# Patient Record
Sex: FEMALE | Race: ASIAN | NOT HISPANIC OR LATINO | Employment: UNEMPLOYED | URBAN - METROPOLITAN AREA
[De-identification: names, ages, dates, MRNs, and addresses within clinical notes are randomized per-mention and may not be internally consistent; named-entity substitution may affect disease eponyms.]

---

## 2024-11-21 ENCOUNTER — HOSPITAL ENCOUNTER (OUTPATIENT)
Facility: HOSPITAL | Age: 59
Discharge: HOME HEALTH | End: 2024-11-22
Attending: ORTHOPAEDIC SURGERY | Admitting: ORTHOPAEDIC SURGERY
Payer: MEDICAID

## 2024-11-21 ENCOUNTER — ANESTHESIA (OUTPATIENT)
Dept: PERIOP | Facility: HOSPITAL | Age: 59
End: 2024-11-21
Payer: MEDICAID

## 2024-11-21 ENCOUNTER — ANESTHESIA EVENT (OUTPATIENT)
Dept: PERIOP | Facility: HOSPITAL | Age: 59
End: 2024-11-21
Payer: MEDICAID

## 2024-11-21 ENCOUNTER — APPOINTMENT (OUTPATIENT)
Dept: GENERAL RADIOLOGY | Facility: HOSPITAL | Age: 59
End: 2024-11-21
Payer: MEDICAID

## 2024-11-21 DIAGNOSIS — Z96.651 STATUS POST TOTAL KNEE REPLACEMENT, RIGHT: Primary | ICD-10-CM

## 2024-11-21 PROCEDURE — 25010000002 ROPIVACAINE PER 1 MG: Performed by: STUDENT IN AN ORGANIZED HEALTH CARE EDUCATION/TRAINING PROGRAM

## 2024-11-21 PROCEDURE — 25010000002 LIDOCAINE PF 2% 2 % SOLUTION

## 2024-11-21 PROCEDURE — 25010000002 LIDOCAINE PF 1% 1 % SOLUTION: Performed by: STUDENT IN AN ORGANIZED HEALTH CARE EDUCATION/TRAINING PROGRAM

## 2024-11-21 PROCEDURE — 97530 THERAPEUTIC ACTIVITIES: CPT

## 2024-11-21 PROCEDURE — 25010000002 MAGNESIUM SULFATE PER 500 MG OF MAGNESIUM

## 2024-11-21 PROCEDURE — C1776 JOINT DEVICE (IMPLANTABLE): HCPCS | Performed by: ORTHOPAEDIC SURGERY

## 2024-11-21 PROCEDURE — 25010000002 HYDROMORPHONE PER 4 MG

## 2024-11-21 PROCEDURE — 25010000002 FENTANYL CITRATE (PF) 50 MCG/ML SOLUTION: Performed by: STUDENT IN AN ORGANIZED HEALTH CARE EDUCATION/TRAINING PROGRAM

## 2024-11-21 PROCEDURE — 25010000002 ONDANSETRON PER 1 MG

## 2024-11-21 PROCEDURE — 25010000002 CLONIDINE PER 1 MG: Performed by: ORTHOPAEDIC SURGERY

## 2024-11-21 PROCEDURE — 25810000003 LACTATED RINGERS PER 1000 ML: Performed by: STUDENT IN AN ORGANIZED HEALTH CARE EDUCATION/TRAINING PROGRAM

## 2024-11-21 PROCEDURE — 25010000002 KETOROLAC TROMETHAMINE PER 15 MG: Performed by: ORTHOPAEDIC SURGERY

## 2024-11-21 PROCEDURE — 25010000002 DEXAMETHASONE SODIUM PHOSPHATE 20 MG/5ML SOLUTION

## 2024-11-21 PROCEDURE — 25010000002 MIDAZOLAM PER 1 MG: Performed by: STUDENT IN AN ORGANIZED HEALTH CARE EDUCATION/TRAINING PROGRAM

## 2024-11-21 PROCEDURE — 25010000002 DEXAMETHASONE PER 1 MG: Performed by: STUDENT IN AN ORGANIZED HEALTH CARE EDUCATION/TRAINING PROGRAM

## 2024-11-21 PROCEDURE — 25010000002 PROPOFOL 200 MG/20ML EMULSION

## 2024-11-21 PROCEDURE — 25010000002 SUGAMMADEX 200 MG/2ML SOLUTION

## 2024-11-21 PROCEDURE — G0378 HOSPITAL OBSERVATION PER HR: HCPCS

## 2024-11-21 PROCEDURE — 25010000002 EPINEPHRINE 1 MG/ML SOLUTION 30 ML VIAL: Performed by: ORTHOPAEDIC SURGERY

## 2024-11-21 PROCEDURE — 25010000002 HYDROMORPHONE PER 4 MG: Performed by: ORTHOPAEDIC SURGERY

## 2024-11-21 PROCEDURE — 25010000002 ROPIVACAINE PER 1 MG: Performed by: ORTHOPAEDIC SURGERY

## 2024-11-21 PROCEDURE — 25010000002 FENTANYL CITRATE (PF) 50 MCG/ML SOLUTION

## 2024-11-21 PROCEDURE — 25010000002 CEFAZOLIN PER 500 MG: Performed by: ORTHOPAEDIC SURGERY

## 2024-11-21 PROCEDURE — 97162 PT EVAL MOD COMPLEX 30 MIN: CPT

## 2024-11-21 PROCEDURE — 25010000002 GLYCOPYRROLATE 0.2 MG/ML SOLUTION

## 2024-11-21 PROCEDURE — C1713 ANCHOR/SCREW BN/BN,TIS/BN: HCPCS | Performed by: ORTHOPAEDIC SURGERY

## 2024-11-21 PROCEDURE — 73560 X-RAY EXAM OF KNEE 1 OR 2: CPT

## 2024-11-21 PROCEDURE — 25010000002 FENTANYL CITRATE (PF) 100 MCG/2ML SOLUTION

## 2024-11-21 DEVICE — IMPLANTABLE DEVICE
Type: IMPLANTABLE DEVICE | Site: KNEE | Status: FUNCTIONAL
Brand: PERSONA® VIVACIT-E®

## 2024-11-21 DEVICE — STEM TIB/KN PERSONA CMT 5D SZC RT: Type: IMPLANTABLE DEVICE | Site: KNEE | Status: FUNCTIONAL

## 2024-11-21 DEVICE — DEV CONTRL TISS STRATAFIX SYMM PDS PLUS VIL CT-1 60CM: Type: IMPLANTABLE DEVICE | Site: KNEE | Status: FUNCTIONAL

## 2024-11-21 DEVICE — IMPLANTABLE DEVICE
Type: IMPLANTABLE DEVICE | Site: KNEE | Status: FUNCTIONAL
Brand: BIOMET® BONE CEMENT R

## 2024-11-21 DEVICE — PAT KN PERSONA VE CRS/LNK CMT 8X29MM: Type: IMPLANTABLE DEVICE | Site: KNEE | Status: FUNCTIONAL

## 2024-11-21 DEVICE — DEV CONTRL TISS STRATAFIXSPIRALMNCRYL PLSPS2 REV3/0 45CM: Type: IMPLANTABLE DEVICE | Site: KNEE | Status: FUNCTIONAL

## 2024-11-21 DEVICE — COMP FEM/KN PERSONA CR CMT COCR NRW SZ5 RT: Type: IMPLANTABLE DEVICE | Site: KNEE | Status: FUNCTIONAL

## 2024-11-21 DEVICE — CAP TOTL KN CMT PRIMARY: Type: IMPLANTABLE DEVICE | Status: FUNCTIONAL

## 2024-11-21 RX ORDER — PROMETHAZINE HYDROCHLORIDE 25 MG/1
25 SUPPOSITORY RECTAL ONCE AS NEEDED
Status: DISCONTINUED | OUTPATIENT
Start: 2024-11-21 | End: 2024-11-21 | Stop reason: HOSPADM

## 2024-11-21 RX ORDER — PROPOFOL 10 MG/ML
INJECTION, EMULSION INTRAVENOUS AS NEEDED
Status: DISCONTINUED | OUTPATIENT
Start: 2024-11-21 | End: 2024-11-21 | Stop reason: SURG

## 2024-11-21 RX ORDER — ROPIVACAINE HYDROCHLORIDE 5 MG/ML
INJECTION, SOLUTION EPIDURAL; INFILTRATION; PERINEURAL
Status: COMPLETED | OUTPATIENT
Start: 2024-11-21 | End: 2024-11-21

## 2024-11-21 RX ORDER — HYDROCODONE BITARTRATE AND ACETAMINOPHEN 5; 325 MG/1; MG/1
1 TABLET ORAL ONCE AS NEEDED
Status: DISCONTINUED | OUTPATIENT
Start: 2024-11-21 | End: 2024-11-21 | Stop reason: HOSPADM

## 2024-11-21 RX ORDER — PROMETHAZINE HYDROCHLORIDE 25 MG/1
25 TABLET ORAL ONCE AS NEEDED
Status: DISCONTINUED | OUTPATIENT
Start: 2024-11-21 | End: 2024-11-21 | Stop reason: HOSPADM

## 2024-11-21 RX ORDER — ONDANSETRON 2 MG/ML
INJECTION INTRAMUSCULAR; INTRAVENOUS AS NEEDED
Status: DISCONTINUED | OUTPATIENT
Start: 2024-11-21 | End: 2024-11-21 | Stop reason: SURG

## 2024-11-21 RX ORDER — HYDROMORPHONE HYDROCHLORIDE 1 MG/ML
0.5 INJECTION, SOLUTION INTRAMUSCULAR; INTRAVENOUS; SUBCUTANEOUS
Status: DISCONTINUED | OUTPATIENT
Start: 2024-11-21 | End: 2024-11-22 | Stop reason: HOSPADM

## 2024-11-21 RX ORDER — LIDOCAINE HYDROCHLORIDE 10 MG/ML
0.5 INJECTION, SOLUTION INFILTRATION; PERINEURAL ONCE AS NEEDED
Status: COMPLETED | OUTPATIENT
Start: 2024-11-21 | End: 2024-11-21

## 2024-11-21 RX ORDER — CETIRIZINE HYDROCHLORIDE 10 MG/1
10 TABLET ORAL DAILY
Status: DISCONTINUED | OUTPATIENT
Start: 2024-11-21 | End: 2024-11-22 | Stop reason: HOSPADM

## 2024-11-21 RX ORDER — DIPHENHYDRAMINE HYDROCHLORIDE 50 MG/ML
12.5 INJECTION INTRAMUSCULAR; INTRAVENOUS
Status: DISCONTINUED | OUTPATIENT
Start: 2024-11-21 | End: 2024-11-21 | Stop reason: HOSPADM

## 2024-11-21 RX ORDER — LABETALOL HYDROCHLORIDE 5 MG/ML
5 INJECTION, SOLUTION INTRAVENOUS
Status: DISCONTINUED | OUTPATIENT
Start: 2024-11-21 | End: 2024-11-21 | Stop reason: HOSPADM

## 2024-11-21 RX ORDER — MAGNESIUM SULFATE HEPTAHYDRATE 500 MG/ML
INJECTION, SOLUTION INTRAMUSCULAR; INTRAVENOUS AS NEEDED
Status: DISCONTINUED | OUTPATIENT
Start: 2024-11-21 | End: 2024-11-21 | Stop reason: SURG

## 2024-11-21 RX ORDER — HYDRALAZINE HYDROCHLORIDE 50 MG/1
50 TABLET, FILM COATED ORAL 3 TIMES DAILY
Status: DISCONTINUED | OUTPATIENT
Start: 2024-11-21 | End: 2024-11-22 | Stop reason: HOSPADM

## 2024-11-21 RX ORDER — DULOXETIN HYDROCHLORIDE 20 MG/1
20 CAPSULE, DELAYED RELEASE ORAL DAILY
Status: DISCONTINUED | OUTPATIENT
Start: 2024-11-21 | End: 2024-11-22 | Stop reason: HOSPADM

## 2024-11-21 RX ORDER — KETOROLAC TROMETHAMINE 30 MG/ML
30 INJECTION, SOLUTION INTRAMUSCULAR; INTRAVENOUS EVERY 6 HOURS PRN
Status: DISCONTINUED | OUTPATIENT
Start: 2024-11-21 | End: 2024-11-22 | Stop reason: HOSPADM

## 2024-11-21 RX ORDER — OXYCODONE AND ACETAMINOPHEN 7.5; 325 MG/1; MG/1
1 TABLET ORAL EVERY 4 HOURS PRN
Status: DISCONTINUED | OUTPATIENT
Start: 2024-11-21 | End: 2024-11-21 | Stop reason: HOSPADM

## 2024-11-21 RX ORDER — ONDANSETRON 4 MG/1
4 TABLET, ORALLY DISINTEGRATING ORAL EVERY 6 HOURS PRN
Status: DISCONTINUED | OUTPATIENT
Start: 2024-11-21 | End: 2024-11-22 | Stop reason: HOSPADM

## 2024-11-21 RX ORDER — FERROUS SULFATE 325(65) MG
325 TABLET ORAL
Status: DISCONTINUED | OUTPATIENT
Start: 2024-11-22 | End: 2024-11-22 | Stop reason: HOSPADM

## 2024-11-21 RX ORDER — SODIUM CHLORIDE 0.9 % (FLUSH) 0.9 %
3-10 SYRINGE (ML) INJECTION AS NEEDED
Status: DISCONTINUED | OUTPATIENT
Start: 2024-11-21 | End: 2024-11-21 | Stop reason: HOSPADM

## 2024-11-21 RX ORDER — HYDROMORPHONE HYDROCHLORIDE 1 MG/ML
0.5 INJECTION, SOLUTION INTRAMUSCULAR; INTRAVENOUS; SUBCUTANEOUS
Status: DISCONTINUED | OUTPATIENT
Start: 2024-11-21 | End: 2024-11-21 | Stop reason: HOSPADM

## 2024-11-21 RX ORDER — NALOXONE HCL 0.4 MG/ML
0.2 VIAL (ML) INJECTION AS NEEDED
Status: DISCONTINUED | OUTPATIENT
Start: 2024-11-21 | End: 2024-11-21 | Stop reason: HOSPADM

## 2024-11-21 RX ORDER — ONDANSETRON 2 MG/ML
4 INJECTION INTRAMUSCULAR; INTRAVENOUS EVERY 6 HOURS PRN
Status: DISCONTINUED | OUTPATIENT
Start: 2024-11-21 | End: 2024-11-22 | Stop reason: HOSPADM

## 2024-11-21 RX ORDER — HYDROCODONE BITARTRATE AND ACETAMINOPHEN 7.5; 325 MG/1; MG/1
2 TABLET ORAL EVERY 4 HOURS PRN
Status: DISCONTINUED | OUTPATIENT
Start: 2024-11-21 | End: 2024-11-22 | Stop reason: HOSPADM

## 2024-11-21 RX ORDER — FLUMAZENIL 0.1 MG/ML
0.2 INJECTION INTRAVENOUS AS NEEDED
Status: DISCONTINUED | OUTPATIENT
Start: 2024-11-21 | End: 2024-11-21 | Stop reason: HOSPADM

## 2024-11-21 RX ORDER — DROPERIDOL 2.5 MG/ML
0.62 INJECTION, SOLUTION INTRAMUSCULAR; INTRAVENOUS
Status: DISCONTINUED | OUTPATIENT
Start: 2024-11-21 | End: 2024-11-21 | Stop reason: HOSPADM

## 2024-11-21 RX ORDER — MELOXICAM 15 MG/1
15 TABLET ORAL DAILY
Status: DISCONTINUED | OUTPATIENT
Start: 2024-11-22 | End: 2024-11-21 | Stop reason: SDUPTHER

## 2024-11-21 RX ORDER — MIDAZOLAM HYDROCHLORIDE 1 MG/ML
1 INJECTION, SOLUTION INTRAMUSCULAR; INTRAVENOUS
Status: DISCONTINUED | OUTPATIENT
Start: 2024-11-21 | End: 2024-11-21 | Stop reason: HOSPADM

## 2024-11-21 RX ORDER — FENTANYL CITRATE 50 UG/ML
INJECTION, SOLUTION INTRAMUSCULAR; INTRAVENOUS AS NEEDED
Status: DISCONTINUED | OUTPATIENT
Start: 2024-11-21 | End: 2024-11-21 | Stop reason: SURG

## 2024-11-21 RX ORDER — SODIUM CHLORIDE, SODIUM LACTATE, POTASSIUM CHLORIDE, CALCIUM CHLORIDE 600; 310; 30; 20 MG/100ML; MG/100ML; MG/100ML; MG/100ML
9 INJECTION, SOLUTION INTRAVENOUS CONTINUOUS
Status: DISCONTINUED | OUTPATIENT
Start: 2024-11-21 | End: 2024-11-21

## 2024-11-21 RX ORDER — HYDRALAZINE HYDROCHLORIDE 20 MG/ML
5 INJECTION INTRAMUSCULAR; INTRAVENOUS
Status: DISCONTINUED | OUTPATIENT
Start: 2024-11-21 | End: 2024-11-21 | Stop reason: HOSPADM

## 2024-11-21 RX ORDER — LIDOCAINE HYDROCHLORIDE 20 MG/ML
INJECTION, SOLUTION EPIDURAL; INFILTRATION; INTRACAUDAL; PERINEURAL AS NEEDED
Status: DISCONTINUED | OUTPATIENT
Start: 2024-11-21 | End: 2024-11-21 | Stop reason: SURG

## 2024-11-21 RX ORDER — TRANEXAMIC ACID 100 MG/ML
INJECTION, SOLUTION INTRAVENOUS AS NEEDED
Status: DISCONTINUED | OUTPATIENT
Start: 2024-11-21 | End: 2024-11-21 | Stop reason: SURG

## 2024-11-21 RX ORDER — FENTANYL CITRATE 50 UG/ML
50 INJECTION, SOLUTION INTRAMUSCULAR; INTRAVENOUS
Status: DISCONTINUED | OUTPATIENT
Start: 2024-11-21 | End: 2024-11-21 | Stop reason: HOSPADM

## 2024-11-21 RX ORDER — ATORVASTATIN CALCIUM 20 MG/1
20 TABLET, FILM COATED ORAL DAILY
Status: DISCONTINUED | OUTPATIENT
Start: 2024-11-21 | End: 2024-11-22 | Stop reason: HOSPADM

## 2024-11-21 RX ORDER — MAGNESIUM HYDROXIDE 1200 MG/15ML
LIQUID ORAL AS NEEDED
Status: DISCONTINUED | OUTPATIENT
Start: 2024-11-21 | End: 2024-11-21 | Stop reason: HOSPADM

## 2024-11-21 RX ORDER — MELOXICAM 15 MG/1
15 TABLET ORAL DAILY
Status: DISCONTINUED | OUTPATIENT
Start: 2024-11-21 | End: 2024-11-22 | Stop reason: HOSPADM

## 2024-11-21 RX ORDER — NALOXONE HCL 0.4 MG/ML
0.1 VIAL (ML) INJECTION
Status: DISCONTINUED | OUTPATIENT
Start: 2024-11-21 | End: 2024-11-22 | Stop reason: HOSPADM

## 2024-11-21 RX ORDER — GLYCOPYRROLATE 0.2 MG/ML
INJECTION INTRAMUSCULAR; INTRAVENOUS AS NEEDED
Status: DISCONTINUED | OUTPATIENT
Start: 2024-11-21 | End: 2024-11-21 | Stop reason: SURG

## 2024-11-21 RX ORDER — ROCURONIUM BROMIDE 10 MG/ML
INJECTION, SOLUTION INTRAVENOUS AS NEEDED
Status: DISCONTINUED | OUTPATIENT
Start: 2024-11-21 | End: 2024-11-21 | Stop reason: SURG

## 2024-11-21 RX ORDER — DEXAMETHASONE SODIUM PHOSPHATE 4 MG/ML
INJECTION, SOLUTION INTRA-ARTICULAR; INTRALESIONAL; INTRAMUSCULAR; INTRAVENOUS; SOFT TISSUE
Status: COMPLETED | OUTPATIENT
Start: 2024-11-21 | End: 2024-11-21

## 2024-11-21 RX ORDER — EPHEDRINE SULFATE 50 MG/ML
5 INJECTION, SOLUTION INTRAVENOUS ONCE AS NEEDED
Status: DISCONTINUED | OUTPATIENT
Start: 2024-11-21 | End: 2024-11-21 | Stop reason: HOSPADM

## 2024-11-21 RX ORDER — ASPIRIN 81 MG/1
81 TABLET ORAL EVERY 12 HOURS SCHEDULED
Status: DISCONTINUED | OUTPATIENT
Start: 2024-11-22 | End: 2024-11-22 | Stop reason: HOSPADM

## 2024-11-21 RX ORDER — ACETAMINOPHEN 500 MG
1000 TABLET ORAL EVERY 6 HOURS
Status: DISCONTINUED | OUTPATIENT
Start: 2024-11-21 | End: 2024-11-22 | Stop reason: HOSPADM

## 2024-11-21 RX ORDER — IPRATROPIUM BROMIDE AND ALBUTEROL SULFATE 2.5; .5 MG/3ML; MG/3ML
3 SOLUTION RESPIRATORY (INHALATION) ONCE AS NEEDED
Status: DISCONTINUED | OUTPATIENT
Start: 2024-11-21 | End: 2024-11-21 | Stop reason: HOSPADM

## 2024-11-21 RX ORDER — KETAMINE HCL IN NACL, ISO-OSM 100MG/10ML
SYRINGE (ML) INJECTION AS NEEDED
Status: DISCONTINUED | OUTPATIENT
Start: 2024-11-21 | End: 2024-11-21 | Stop reason: SURG

## 2024-11-21 RX ORDER — POTASSIUM CHLORIDE 750 MG/1
10 TABLET, EXTENDED RELEASE ORAL DAILY
Status: DISCONTINUED | OUTPATIENT
Start: 2024-11-21 | End: 2024-11-22 | Stop reason: HOSPADM

## 2024-11-21 RX ORDER — DOCUSATE SODIUM 100 MG/1
100 CAPSULE, LIQUID FILLED ORAL 2 TIMES DAILY PRN
Status: DISCONTINUED | OUTPATIENT
Start: 2024-11-21 | End: 2024-11-22 | Stop reason: HOSPADM

## 2024-11-21 RX ORDER — PANTOPRAZOLE SODIUM 40 MG/1
40 TABLET, DELAYED RELEASE ORAL
Status: DISCONTINUED | OUTPATIENT
Start: 2024-11-22 | End: 2024-11-22 | Stop reason: HOSPADM

## 2024-11-21 RX ORDER — MECLIZINE HYDROCHLORIDE 25 MG/1
25 TABLET ORAL AS NEEDED
Status: DISCONTINUED | OUTPATIENT
Start: 2024-11-21 | End: 2024-11-22 | Stop reason: HOSPADM

## 2024-11-21 RX ORDER — HYDROCODONE BITARTRATE AND ACETAMINOPHEN 7.5; 325 MG/1; MG/1
1 TABLET ORAL EVERY 4 HOURS PRN
Status: DISCONTINUED | OUTPATIENT
Start: 2024-11-21 | End: 2024-11-22 | Stop reason: HOSPADM

## 2024-11-21 RX ORDER — ONDANSETRON 2 MG/ML
4 INJECTION INTRAMUSCULAR; INTRAVENOUS ONCE AS NEEDED
Status: DISCONTINUED | OUTPATIENT
Start: 2024-11-21 | End: 2024-11-21 | Stop reason: HOSPADM

## 2024-11-21 RX ORDER — FENTANYL CITRATE 50 UG/ML
50 INJECTION, SOLUTION INTRAMUSCULAR; INTRAVENOUS ONCE AS NEEDED
Status: COMPLETED | OUTPATIENT
Start: 2024-11-21 | End: 2024-11-21

## 2024-11-21 RX ORDER — LISINOPRIL 40 MG/1
40 TABLET ORAL NIGHTLY
Status: DISCONTINUED | OUTPATIENT
Start: 2024-11-21 | End: 2024-11-22 | Stop reason: HOSPADM

## 2024-11-21 RX ORDER — SODIUM CHLORIDE 0.9 % (FLUSH) 0.9 %
3 SYRINGE (ML) INJECTION EVERY 12 HOURS SCHEDULED
Status: DISCONTINUED | OUTPATIENT
Start: 2024-11-21 | End: 2024-11-21 | Stop reason: HOSPADM

## 2024-11-21 RX ORDER — AMOXICILLIN 250 MG
1 CAPSULE ORAL 2 TIMES DAILY
Status: DISCONTINUED | OUTPATIENT
Start: 2024-11-21 | End: 2024-11-22 | Stop reason: HOSPADM

## 2024-11-21 RX ORDER — DEXAMETHASONE SODIUM PHOSPHATE 4 MG/ML
INJECTION, SOLUTION INTRA-ARTICULAR; INTRALESIONAL; INTRAMUSCULAR; INTRAVENOUS; SOFT TISSUE AS NEEDED
Status: DISCONTINUED | OUTPATIENT
Start: 2024-11-21 | End: 2024-11-21 | Stop reason: SURG

## 2024-11-21 RX ADMIN — LIDOCAINE HYDROCHLORIDE 60 MG: 20 INJECTION, SOLUTION EPIDURAL; INFILTRATION; INTRACAUDAL; PERINEURAL at 11:12

## 2024-11-21 RX ADMIN — SODIUM CHLORIDE, POTASSIUM CHLORIDE, SODIUM LACTATE AND CALCIUM CHLORIDE 9 ML/HR: 600; 310; 30; 20 INJECTION, SOLUTION INTRAVENOUS at 09:00

## 2024-11-21 RX ADMIN — SODIUM CHLORIDE 2000 MG: 900 INJECTION INTRAVENOUS at 11:00

## 2024-11-21 RX ADMIN — MAGNESIUM SULFATE HEPTAHYDRATE 2 G: 500 INJECTION, SOLUTION INTRAMUSCULAR; INTRAVENOUS at 11:30

## 2024-11-21 RX ADMIN — OXYCODONE AND ACETAMINOPHEN 1 TABLET: 7.5; 325 TABLET ORAL at 13:53

## 2024-11-21 RX ADMIN — HYDROMORPHONE HYDROCHLORIDE 0.5 MG: 1 INJECTION, SOLUTION INTRAMUSCULAR; INTRAVENOUS; SUBCUTANEOUS at 14:16

## 2024-11-21 RX ADMIN — HYDROMORPHONE HYDROCHLORIDE 0.5 MG: 1 INJECTION, SOLUTION INTRAMUSCULAR; INTRAVENOUS; SUBCUTANEOUS at 14:00

## 2024-11-21 RX ADMIN — FENTANYL CITRATE 50 MCG: 50 INJECTION, SOLUTION INTRAMUSCULAR; INTRAVENOUS at 09:18

## 2024-11-21 RX ADMIN — CETIRIZINE HYDROCHLORIDE 10 MG: 10 TABLET, FILM COATED ORAL at 16:11

## 2024-11-21 RX ADMIN — SUGAMMADEX 200 MG: 100 INJECTION, SOLUTION INTRAVENOUS at 13:33

## 2024-11-21 RX ADMIN — LIDOCAINE HYDROCHLORIDE 0.5 ML: 10 INJECTION, SOLUTION EPIDURAL; INFILTRATION; INTRACAUDAL; PERINEURAL at 09:00

## 2024-11-21 RX ADMIN — FENTANYL CITRATE 50 MCG: 50 INJECTION, SOLUTION INTRAMUSCULAR; INTRAVENOUS at 11:47

## 2024-11-21 RX ADMIN — FENTANYL CITRATE 50 MCG: 50 INJECTION, SOLUTION INTRAMUSCULAR; INTRAVENOUS at 13:53

## 2024-11-21 RX ADMIN — LISINOPRIL 40 MG: 40 TABLET ORAL at 21:23

## 2024-11-21 RX ADMIN — DEXAMETHASONE SODIUM PHOSPHATE 6 MG: 4 INJECTION, SOLUTION INTRAMUSCULAR; INTRAVENOUS at 11:25

## 2024-11-21 RX ADMIN — Medication 30 MG: at 12:07

## 2024-11-21 RX ADMIN — HYDROMORPHONE HYDROCHLORIDE 0.5 MG: 1 INJECTION, SOLUTION INTRAMUSCULAR; INTRAVENOUS; SUBCUTANEOUS at 18:03

## 2024-11-21 RX ADMIN — TRANEXAMIC ACID 1000 MG: 100 INJECTION, SOLUTION INTRAVENOUS at 11:30

## 2024-11-21 RX ADMIN — DEXAMETHASONE SODIUM PHOSPHATE 4 MG: 4 INJECTION, SOLUTION INTRA-ARTICULAR; INTRALESIONAL; INTRAMUSCULAR; INTRAVENOUS; SOFT TISSUE at 09:25

## 2024-11-21 RX ADMIN — ATORVASTATIN CALCIUM 20 MG: 20 TABLET, FILM COATED ORAL at 16:05

## 2024-11-21 RX ADMIN — SENNOSIDES AND DOCUSATE SODIUM 1 TABLET: 50; 8.6 TABLET ORAL at 21:24

## 2024-11-21 RX ADMIN — PROPOFOL 150 MG: 10 INJECTION, EMULSION INTRAVENOUS at 11:12

## 2024-11-21 RX ADMIN — POTASSIUM CHLORIDE 10 MEQ: 750 TABLET, EXTENDED RELEASE ORAL at 16:05

## 2024-11-21 RX ADMIN — SODIUM CHLORIDE 2000 MG: 900 INJECTION INTRAVENOUS at 18:11

## 2024-11-21 RX ADMIN — HYDRALAZINE HYDROCHLORIDE 50 MG: 50 TABLET ORAL at 16:05

## 2024-11-21 RX ADMIN — ONDANSETRON 4 MG: 2 INJECTION INTRAMUSCULAR; INTRAVENOUS at 11:25

## 2024-11-21 RX ADMIN — PROPOFOL 50 MG: 10 INJECTION, EMULSION INTRAVENOUS at 11:23

## 2024-11-21 RX ADMIN — GLYCOPYRROLATE 0.1 MG: 0.2 INJECTION INTRAMUSCULAR; INTRAVENOUS at 12:07

## 2024-11-21 RX ADMIN — ACETAMINOPHEN 1000 MG: 500 TABLET ORAL at 16:05

## 2024-11-21 RX ADMIN — HYDROMORPHONE HYDROCHLORIDE 0.5 MG: 1 INJECTION, SOLUTION INTRAMUSCULAR; INTRAVENOUS; SUBCUTANEOUS at 14:30

## 2024-11-21 RX ADMIN — HYDRALAZINE HYDROCHLORIDE 50 MG: 50 TABLET ORAL at 21:23

## 2024-11-21 RX ADMIN — DULOXETINE HYDROCHLORIDE 20 MG: 20 CAPSULE, DELAYED RELEASE ORAL at 17:51

## 2024-11-21 RX ADMIN — MELOXICAM 15 MG: 15 TABLET ORAL at 09:04

## 2024-11-21 RX ADMIN — ROPIVACAINE HYDROCHLORIDE 15 ML: 5 INJECTION EPIDURAL; INFILTRATION; PERINEURAL at 09:25

## 2024-11-21 RX ADMIN — FENTANYL CITRATE 50 MCG: 50 INJECTION, SOLUTION INTRAMUSCULAR; INTRAVENOUS at 11:40

## 2024-11-21 RX ADMIN — MIDAZOLAM 0.5 MG: 1 INJECTION INTRAMUSCULAR; INTRAVENOUS at 09:18

## 2024-11-21 RX ADMIN — ROCURONIUM BROMIDE 50 MG: 10 INJECTION, SOLUTION INTRAVENOUS at 11:13

## 2024-11-21 RX ADMIN — TRANEXAMIC ACID 1000 MG: 100 INJECTION, SOLUTION INTRAVENOUS at 12:47

## 2024-11-21 NOTE — OP NOTE
TOTAL KNEE ARTHROPLASTY  Procedure Note    Rocio Moore  11/21/2024    Pre-op Diagnosis:  Right Knee Osteoarthritis  Post-op Diagnosis:  Same  Procedure:  Right Total Knee Arthroplasty  Surgical Approach: Knee Medial Parapatellar   Surgeon:  Gabe Ashley MD  Anesthesia: General with Block, Anesthesiologist: Joseph Feng MD  CRNA: Eduar Garcia CRNA  Staff: Circulator: Danna Rodriguez RN; José Antonio Chiu RN  Scrub Person: Mishel Villa  Vendor Representative: Landon Boo  Assistant: Dora Burton PA-C; Juvencio Segovia PA-C CFA  Estimated Blood Loss: 200 mL  Specimens: * No orders in the log *  Drains: none  Complications: None    Components Utilized:   [employer]an  Implant Name Type Inv. Item Serial No.  Lot No. LRB No. Used Action   DEV CONTRL TISS STRATAFIX SYMM PDS PLUS ANTONIA CT-1 60CM - [id number] Implant DEV CONTRL TISS STRATAFIX SYMM PDS PLUS ANTONIA CT-1 60CM  [employer] [id number] Right 1 Implanted   DEV CONTRL TISS STRATAFIX SYMM PDS PLUS ANTONIA CT-1 60CM - [id number] Implant DEV CONTRL TISS STRATAFIX SYMM PDS PLUS ANTONIA CT-1 60CM  [employer]  [employer] J [id number] Right 1 Implanted   DEV CONTRL TISS STRATAFIXSPIRALMNCRYL PLSPS2 REV3/0 45CM - [id number] Implant DEV CONTRL TISS STRATAFIXSPIRALMNCRYL PLSPS2 REV3/0 45CM  [employer]  DIV [employer] J [id number] Right 1 Implanted   CMT BONE R 1X40 - [id number] Implant CMT BONE R 1X40  [employer] [id number] Right 2 Implanted   COMP FEM/KN PERSONA CR CMT COCR NRW SZ5 RT - [id number] Implant COMP FEM/KN PERSONA CR CMT COCR NRW SZ5 RT  [employer] [id number] Right 1 Implanted   STEM TIB/KN PERSONA CMT 5D SZC RT - [id number] Implant STEM TIB/KN PERSONA CMT 5D SZC RT  [employer] [id number] Right 1 Implanted   PAT KN PERSONA VE CRS/LNK CMT 8X29MM - [id number] Implant PAT KN PERSONA VE CRS/LNK CMT 8X29MM  [employer] [id number] Right 1 Implanted   ART/SRF KN PERSONA VIT/E CD 4TO5 14MM MED/RT - [id number] Implant ART/SRF KN PERSONA VIT/E CD 4TO5 14MM  MED/RT  [employer] [facility] [id number] Right 1 Implanted         Indication for Procedure:  The patient is a 59 y.o. female presents today for a total knee arthroplasty procedure because of failure to conservatively manage the patient's pain for arthritis.  She was educated in risks of surgery that could include possible risk of infection, deep venous thrombosis, pulmonary embolism, fracture, neurovascular injury, leg length discrepancy, dislocation, possible persistent pain, need for additional surgeries, anesthetic risks, medical risks including heart attack and stroke, and death.  The discussion occurred in the office pre-operatively, and patient had the opportunity to ask questions, and concerns about the proposed surgery.  She wished to proceed.  The patient was noted to have chronic bilateral patellar lateral dislocations.  We discussed preoperatively that there may be difficulty with reduction of the patellas due to the chronicity.  She voiced understanding wished to proceed.    Protocols for intravenous antibiotics and venous thrombosis were followed for this patient.  IV antibiotics were infused prior to surgery and will be discontinued within 24 hours of completion of the surgical procedure.  Thrombosis prophylaxis will be initiated within 24 hours of the completion of the surgical procedure.      Procedure:  The patient was seen in the preoperative holding area where her right knee surgical site was marked, preoperative antibiotics were received, a preoperative nerve block was performed.  She was taken to the operating room and placed on the operating table.  The correct right lower extremity was prepped and draped in a typical sterile fashion.  A timeout was performed confirming the correct surgical site and procedure.  Esmarch was used to exsanguinate the leg and tourniquet was inflated to 250 mmHg.    A 10 blade scalpel was used to make a longitudinal incision from above the patella to medial to the tibial  tubercle.  A medial dion-patellar arthrotomy was performed with another 10 blade scalpel.  The patella was completely dislocated laterally on a shelf of bone.  The medial joint line was elevated subperiosteally with electrocautery and a Rosa elevator.  The patellar fat pad was removed.  The patella was initially left alone until later in the case due to the deformity and dislocation.  An extensive lateral release was performed to mobilize the patella.    The knee was then flexed and [place]'s line was identified.  The drill was placed into the distal femur into the medullary canal.  The intramedullary distal femoral valgus cutting guide set to 5 degrees of femoral valgus with +1 mm cut.  It was then placed into the medullary canal.  It was pinned and the oscillating saw was used to make the osteotomy.  The anterior referencing distal femoral guide was placed and the above femoral size 5 narrow was measured.  5 degrees of external rotation was set.  An extra 2 degrees of rotation was also placed using the block shifter to hopefully provide the patella ability to track centrally.  The 4 in 1 femoral cutting guide was placed and pinned and the oscillating saw was used to make the appropriate cuts.      The extramedullary cutting guide was placed aligned with the tibial eminence superiorly and the tibial shaft distally, pinned 4 mm from the medial tibial plateau.  Tibial slope was accounted for.  The oscillating saw was used to complete the osteotomy cuts.    A lamina  was placed medially and then laterally to remove the medial and lateral meniscus and the posterior osteophytes.  The tibial baseplate was placed on the tibial surface with a drop cecile to confirm an appropriate cut and size of implant.  This was a size D. It was then pinned externally rotated to the medial third of the tibial tubercle.  The trial reduction was performed with the above implants and was found to be stable in all planes.  The patella  was everted, measured, and reamed.  It was sized to a 29 mm button.  3 lug holes were drilled.  Trial patella was placed.  Utilizing towel clips, the patella did track along the trochlear groove.    The lug holes were drilled in the distal femur.  The tibia was drilled and broached in the typical fashion.  The trial components were removed and the final implants were confirmed and opened.  The bone surfaces were then irrigated and dried.  Standard Estephanie-Biomet cement was mixed and placed on the cut bone surfaces and back side of the implants.  The implants were impacted into place, and the trial polyethylene spacer was placed.  The knee was placed into extension.  A stack of towels was placed under the ankle and the cement was allowed to harden. The tourniquet was then dropped.  Hemostasis was obtained.  The wound was then irrigated with the pulse lavage irrigation system with a 1 L of normal saline.  The local anesthetic mixture was injected throughout the knee for post-operative pain control.  The final size 14 mm medial congruent polyethylene implant was then inserted and the knee was flexed to 90 degrees.  The arthrotomy was closed with #1 Vicryl suture and #1 Stratafix suture.  The subcutaneous tissue was closed with a series of #1 Vicryl suture and 2-0 Vicryl suture.  The skin was closed with 3-0 Stratafix suture.  Dermabond, Telfa, Tegaderm, and Ace bandage were applied to the knee.    Sponge and needle counts were completed and were correct.  The patient was awakened from anesthetic and was returned to recovery in stable condition.    Postoperative Plan:  The patient will be admitted.  She will be started on Aspirin 81 mg BID for 4 weeks for dvt prophylaxis and have sequential compression devices.  She will be on a 24 hour antibiotic protocol.  She will be weight bearing as tolerated with physical therapy.    Juvencio Segovia PA-C assisted for the entirety of the surgical procedure.  He was necessary for  retraction, implant placement, and closure of the wound.    No complications were encountered during this surgical procedure.      Gabe Ashley MD     Date: 11/21/2024  Time: 18:03 EST

## 2024-11-21 NOTE — ANESTHESIA PROCEDURE NOTES
Airway  Urgency: elective    Date/Time: 11/21/2024 11:18 AM  Airway not difficult    General Information and Staff    Patient location during procedure: OR  CRNA/CAA: Eduar Garcia CRNA    Indications and Patient Condition  Indications for airway management: airway protection    Preoxygenated: yes  Mask difficulty assessment: 1 - vent by mask    Final Airway Details  Final airway type: endotracheal airway      Successful airway: ETT  Cuffed: yes   Successful intubation technique: direct laryngoscopy  Facilitating devices/methods: cricoid pressure and Bougie  Endotracheal tube insertion site: oral  Blade: Jimmy  Blade size: 3  ETT size (mm): 7.0  Cormack-Lehane Classification: grade IIb - view of arytenoids or posterior of glottis only  Placement verified by: chest auscultation and capnometry   Measured from: teeth  ETT/EBT  to teeth (cm): 19  Number of attempts at approach: 2  Assessment: lips, teeth, and gum same as pre-op and atraumatic intubation

## 2024-11-21 NOTE — PLAN OF CARE
Goal Outcome Evaluation:      Patient is POD #0 for a right total knee arthroplasty.  Patient speaks German.  Daughter present to translate. Vitals are stable. RLE remains elevated. Ice applied to operative extemity. WBAT. Patient was able to ambulate from the stretcher to the bed. Gait unsteady. Norco, dialudid, and scheduled tylenol were ordered for pain management. Teaching provided on how to use the incentive spirometer.  Patient has voided. Worked with physical therapy. No signs of distress noted.  Will continue to monitor and update accordingly.

## 2024-11-21 NOTE — ANESTHESIA PREPROCEDURE EVALUATION
Anesthesia Evaluation     Patient summary reviewed and Nursing notes reviewed   no history of anesthetic complications:   NPO Solid Status: > 8 hours  NPO Liquid Status: > 2 hours           Airway   Mallampati: II  TM distance: >3 FB  Neck ROM: full  Dental      Pulmonary    Cardiovascular     (+) hypertension      Neuro/Psych  GI/Hepatic/Renal/Endo    (+) GERD    Musculoskeletal     Abdominal    Substance History      OB/GYN          Other   arthritis,                 Anesthesia Plan    ASA 2     general     intravenous induction     Anesthetic plan, risks, benefits, and alternatives have been provided, discussed and informed consent has been obtained with: patient.    CODE STATUS:

## 2024-11-21 NOTE — ANESTHESIA PROCEDURE NOTES
Peripheral Block    Pre-sedation assessment completed: 11/21/2024 9:23 AM    Patient reassessed immediately prior to procedure    Patient location during procedure: holding area  Start time: 11/21/2024 9:23 AM  Stop time: 11/21/2024 9:25 AM  Reason for block: at surgeon's request and post-op pain management  Performed by  Anesthesiologist: Mike Feng MD  Preanesthetic Checklist  Completed: patient identified, IV checked, site marked, risks and benefits discussed, surgical consent, monitors and equipment checked, pre-op evaluation and timeout performed  Prep:  Pt Position: supine  Sterile barriers:cap, washed/disinfected hands, gloves, mask and alcohol skin prep  Prep: ChloraPrep  Patient monitoring: blood pressure monitoring, continuous pulse oximetry and EKG  Procedure    Sedation: yes  Performed under: local infiltration  Guidance:ultrasound guided    ULTRASOUND INTERPRETATION.  Using ultrasound guidance a 21 G gauge needle was placed in close proximity to the nerve, at which point, under ultrasound guidance anesthetic was injected in the area of the nerve and spread of the anesthesia was seen on ultrasound in close proximity thereto.  There were no abnormalities seen on ultrasound; a digital image was taken; and the patient tolerated the procedure with no complications. Images:still images obtained, printed/placed on chart    Laterality:right  Block Type:adductor canal block  Injection Technique:single-shot  Needle Type:echogenic and Tuohy  Needle Gauge:21 G  Resistance on Injection: none    Medications Used: ropivacaine (NAROPIN) 0.5 % injection - Injection   15 mL - 11/21/2024 9:25:00 AM  dexamethasone (DECADRON) injection - Injection   4 mg - 11/21/2024 9:25:00 AM      Post Assessment  Injection Assessment: negative aspiration for heme, no paresthesia on injection and incremental injection  Patient Tolerance:comfortable throughout block  Complications:no  Additional Notes  Ultrasound guidance used to  visualize nerve anatomy, guide needle placement and verify local anesthetic disbursement.       Performed by: Mike Feng MD

## 2024-11-21 NOTE — PLAN OF CARE
Goal Outcome Evaluation:  Plan of Care Reviewed With: patient, family      Pt is a 60 y/o F POD0 R TKA. Pts dtr present to translate (declined used of iPad) and report pt requires assist at baseline for all ADLs/mobility, using a RW for short house-hold distances and WC for longer distances, owns a cane, has caregivers throughout the week and lives with her son. Pts dtr states she will take her home to her house at [place] as there will only be one small door threshold step, compared to several steps at the sons house. Today, pt was Kindra for bed mobility, Min/ModA for transfers w/ RW and Min/ModA for ambulation of 14' w/ RW demoing typical post-op antalgic gait mechanics in a step-to pattern. No knee buckling, LOB, SOB, or dizziness. Pt performed TKA protocol ther-ex for 5 reps demoing R knee AROM of 5-70deg. Pts family edu on HEP, icing, positioning, and sequencing and safety of ambulation; they v/u. Pt will benefit from skilled PT services to address their post-op impaired gait, balance, ROM and endurance. EDUARDOAR w/ RN following session. Rec home w/ HH PT at [place].             Anticipated Discharge Disposition (PT): home with home health, home with assist

## 2024-11-21 NOTE — THERAPY EVALUATION
"Patient Name: Rocio Moore  : 1965    MRN: [id number]                              Today's Date: 2024       Admit Date: 2024    Visit Dx: No diagnosis found.  Patient Active Problem List   Diagnosis    Status post total knee replacement, right     Past Medical History:   Diagnosis Date    Arthritis     GERD (gastroesophageal reflux disease)     H/O dizziness     Hearing loss     HEARING AIDS    Hypertension     Right knee pain      Past Surgical History:   Procedure Laterality Date    SHOULDER SURGERY      STATES \"HAD SWOLLEN LUMP REMOVED FROM COVID SHOT\"      General Information       Row Name 24          Physical Therapy Time and Intention    Document Type evaluation  -MG     Mode of Treatment individual therapy;physical therapy  -MG       Row Name 24          General Information    Patient Profile Reviewed yes  -MG     Prior Level of Function min assist:  Uses RW for short distances, WC for longer distances. Caregivers during the week who assist w/ all ADLs and mobility. Family also very supportive.  -MG     Existing Precautions/Restrictions fall  -MG     Barriers to Rehab previous functional deficit;language barrier  Pt speaks [place]. Dtrs present who decline use of iPad and translate for pt.  -MG       Row Name 24          Living Environment    People in Home child(karan), adult  Typically lives w/ her son, but will be going to stay with her dtr who will be able to help her .  -MG       Row Name 24          Home Main Entrance    Number of Stairs, Main Entrance one  Small door threshold step  -MG     Stair Railings, Main Entrance none  -MG       Row Name 24          Stairs Within Home, Primary    Number of Stairs, Within Home, Primary none  -MG       Row Name 24          Cognition    Orientation Status (Cognition) oriented x 3  -MG       Row Name 24          Safety Issues/Impairments Affecting Functional Mobility    " Safety Issues Affecting Function (Mobility) awareness of need for assistance;insight into deficits/self-awareness  -MG     Impairments Affecting Function (Mobility) balance;endurance/activity tolerance;pain;range of motion (ROM);strength  -MG     Comment, Safety Issues/Impairments (Mobility) Gait belt and non-skid socks donned. Pts dtrs present and assisting as needed.  -MG               User Key  (r) = Recorded By, (t) = Taken By, (c) = Cosigned By      Initials Name Provider Type    MG Jacklyn Higgins, PT Physical Therapist                   Mobility       Row Name 11/21/24 1759          Bed Mobility    Bed Mobility supine-sit;sit-supine  -MG     Supine-Sit [place] (Bed Mobility) minimum assist (75% patient effort);verbal cues;nonverbal cues (demo/gesture)  -MG     Sit-Supine [place] (Bed Mobility) minimum assist (75% patient effort);verbal cues;nonverbal cues (demo/gesture)  -MG     Assistive Device (Bed Mobility) head of bed elevated;bed rails  -MG       Row Name 11/21/24 1759          Bed-Chair Transfer    Bed-Chair [place] (Transfers) minimum assist (75% patient effort);moderate assist (50% patient effort);verbal cues  -MG     Assistive Device (Bed-Chair Transfers) walker, front-wheeled  -MG     Comment, (Bed-Chair Transfer) Stand/step pivot to the L, EOB to BSC.  -MG       Row Name 11/21/24 1759          Sit-Stand Transfer    Sit-Stand [place] (Transfers) minimum assist (75% patient effort);moderate assist (50% patient effort);verbal cues  -MG     Assistive Device (Sit-Stand Transfers) walker, front-wheeled  -MG     Comment, (Sit-Stand Transfer) x2 EOB, x1 BSC. Cues for hand placement and sequencing. Difficulty following cueing d/t drowsiness.  -MG       Row Name 11/21/24 1759          Gait/Stairs (Locomotion)    [place] Level (Gait) minimum assist (75% patient effort);moderate assist (50% patient effort);verbal cues  -MG     Assistive Device (Gait) walker, front-wheeled  -MG      Distance in Feet (Gait) 14  -MG     Deviations/Abnormal Patterns (Gait) gait speed decreased;antalgic;stride length decreased  -MG     Bilateral Gait Deviations forward flexed posture;heel strike decreased  -MG     Right Sided Gait Deviations weight shift ability decreased  -MG     Comment, (Gait/Stairs) Amb EOB<>doorway. Increase Min>Mod as she fatigued and demoing increased trunk flexion. Cued and demonstrated sequencing prior to mobility with pt initially following, then as fatigued had increased difficulty. Mild unsteadiness. No overt LOB, dizziness or SOB.  -MG               User Key  (r) = Recorded By, (t) = Taken By, (c) = Cosigned By      Initials Name Provider Type    MG Jacklyn Higgins, PT Physical Therapist                   Obj/Interventions       Row Name 11/21/24 1802          Range of Motion Comprehensive    General Range of Motion lower extremity range of motion deficits identified  -MG     Comment, General Range of Motion R knee AROM 5-75. Limited by pain. LLE, R hip/ankle WFL.  -MG       Row Name 11/21/24 1802          Strength Comprehensive (MMT)    General Manual Muscle Testing (MMT) Assessment lower extremity strength deficits identified;upper extremity strength deficits identified  -MG     Comment, General Manual Muscle Testing (MMT) Assessment Generalized weakness. Grossly 4/5.  -MG       Row Name 11/21/24 1802          Motor Skills    Therapeutic Exercise other (see comments)  TKA protocol x5. Edu family to have her perform x10 TID.  -MG       Row Name 11/21/24 1802          Balance    Balance Assessment sitting static balance;sitting dynamic balance;standing static balance;standing dynamic balance  -MG     Static Sitting Balance standby assist  -MG     Dynamic Sitting Balance standby assist  -MG     Position, Sitting Balance sitting edge of bed  -MG     Static Standing Balance minimal assist  -MG     Dynamic Standing Balance minimal assist;moderate assist;verbal cues  -MG     Position/Device  Used, Standing Balance supported;walker, front-wheeled  -MG       Row Name 11/21/24 1802          Sensory Assessment (Somatosensory)    Sensory Assessment (Somatosensory) sensation intact  -MG               User Key  (r) = Recorded By, (t) = Taken By, (c) = Cosigned By      Initials Name Provider Type    MG Jacklyn Higgins, PT Physical Therapist                   Goals/Plan       Row Name 11/21/24 1803          Bed Mobility Goal 1 (PT)    Activity/Assistive Device (Bed Mobility Goal 1, PT) bed mobility activities, all  -MG     [place] Level/Cues Needed (Bed Mobility Goal 1, PT) contact guard required  -MG     Time Frame (Bed Mobility Goal 1, PT) 1 week  -MG       Row Name 11/21/24 1803          Transfer Goal 1 (PT)    Activity/Assistive Device (Transfer Goal 1, PT) transfers, all  -MG     [place] Level/Cues Needed (Transfer Goal 1, PT) contact guard required  -MG     Time Frame (Transfer Goal 1, PT) 1 week  -MG       Row Name 11/21/24 1803          Gait Training Goal 1 (PT)    Activity/Assistive Device (Gait Training Goal 1, PT) gait (walking locomotion)  -MG     [place] Level (Gait Training Goal 1, PT) contact guard required;minimum assist (75% or more patient effort)  -MG     Distance (Gait Training Goal 1, PT) 40  -MG     Time Frame (Gait Training Goal 1, PT) 1 week  -MG       Row Name 11/21/24 1803          Therapy Assessment/Plan (PT)    Planned Therapy Interventions (PT) balance training;bed mobility training;gait training;home exercise program;patient/family education;stretching;strengthening;neuromuscular re-education;ROM (range of motion);transfer training;postural re-education  -MG               User Key  (r) = Recorded By, (t) = Taken By, (c) = Cosigned By      Initials Name Provider Type    Jacklyn Holland, PT Physical Therapist                   Clinical Impression       Row Name 11/21/24 1803          Pain    Pretreatment Pain Rating 7/10  -MG     Posttreatment Pain Rating 9/10  -MG      Pain Location knee  -MG     Pain Side/Orientation right  Simultaneous filing. User may be unaware of other data.  -MG     Pain Management Interventions nursing notified;premedicated for activity;cold applied;activity modification encouraged;exercise or physical activity utilized;positioning techniques utilized  -MG     Response to Pain Interventions activity participation with increased pain  -MG       Row Name 11/21/24 1803          Plan of Care Review    Plan of Care Reviewed With patient;family  -MG     Outcome Evaluation Pt is a 58 y/o F POD0 R TKA. Pts dtr present to translate (declined used of iPad) and report pt requires assist at baseline for all ADLs/mobility, using a RW for short house-hold distances and WC for longer distances, owns a cane, has caregivers throughout the week and lives with her son. Pts dtr states she will take her [place] as there will only be one small door threshold step, compared to several steps at the sons house. Today, pt was Kindra for bed mobility, Min/ModA for transfers w/ RW and Min/ModA for ambulation of 14' w/ RW demoing typical post-op antalgic gait mechanics in a step-to pattern. No knee buckling, LOB, SOB, or dizziness. Pt performed TKA protocol ther-ex for 5 reps demoing R knee AROM of 5-70deg. Pts family edu on HEP, icing, positioning, and sequencing and safety of ambulation; they v/u. Pt will benefit from skilled PT services to address their post-op impaired gait, balance, ROM and endurance. SBAR w/ RN following session. Rec home w/ HH PT at [facility].  -MG       Row Name 11/21/24 1803          Therapy Assessment/Plan (PT)    Rehab Potential (PT) fair  -MG     Criteria for Skilled Interventions Met (PT) yes;meets criteria  -MG     Therapy Frequency (PT) daily  -MG       Row Name 11/21/24 1803          Vital Signs    Pretreatment Heart Rate (beats/min) 77  -MG     Pre SpO2 (%) 94  -MG     O2 Delivery Pre Treatment supplemental O2  -MG     O2 Delivery Intra  Treatment room air  -MG     Post SpO2 (%) 93  -MG     O2 Delivery Post Treatment supplemental O2  -MG     Pre Patient Position Supine  -MG     Intra Patient Position Standing  -MG     Post Patient Position Supine  -MG       Row Name 11/21/24 1803          Positioning and Restraints    Pre-Treatment Position in bed  -MG     Post Treatment Position bed  -MG     In Bed notified nsg;supine;fowlers;call light within reach;encouraged to call for assist;exit alarm on;with family/caregiver;side rails up x3;SCD pump applied;with nsg;RLE elevated  -MG               User Key  (r) = Recorded By, (t) = Taken By, (c) = Cosigned By      Initials Name Provider Type    Jacklyn Holland, PT Physical Therapist                   Outcome Measures       Row Name 11/21/24 1804 11/21/24 1528       How much help from another person do you currently need...    Turning from your back to your side while in flat bed without using bedrails? 3  -MG 3  -DD    Moving from lying on back to sitting on the side of a flat bed without bedrails? 3  -MG 3  -DD    Moving to and from a bed to a chair (including a wheelchair)? 2  -MG 3  -DD    Standing up from a chair using your arms (e.g., wheelchair, bedside chair)? 2  -MG 3  -DD    Climbing 3-5 steps with a railing? 1  -MG 2  -DD    To walk in hospital room? 2  -MG 3  -DD    AM-PAC 6 Clicks Score (PT) 13  -MG 17  -DD      Row Name 11/21/24 1512          How much help from another person do you currently need...    Turning from your back to your side while in flat bed without using bedrails? 3  -DD     Moving from lying on back to sitting on the side of a flat bed without bedrails? 3  -DD     Moving to and from a bed to a chair (including a wheelchair)? 3  -DD     Standing up from a chair using your arms (e.g., wheelchair, bedside chair)? 3  -DD     Climbing 3-5 steps with a railing? 2  -DD     To walk in hospital room? 3  -DD     AM-PAC 6 Clicks Score (PT) 17  -DD               User Key  (r) = Recorded  By, (t) = Taken By, (c) = Cosigned By      Initials Name Provider Type    MG Jacklyn Higgins, DK Physical Therapist    Kelly Mcelroy RN Registered Nurse                                 Physical Therapy Education       Title: PT OT SLP Therapies (Done)       Topic: Physical Therapy (Done)       Point: Mobility training (Done)       Learning Progress Summary            Patient Acceptance, E,D,I, NR,VU by  at 11/21/2024 1804   Family Acceptance, E,D,I, NR,VU by  at 11/21/2024 1804                      Point: Home exercise program (Done)       Learning Progress Summary            Patient Acceptance, E,D,I, NR,VU by  at 11/21/2024 1804   Family Acceptance, E,D,I, NR,VU by  at 11/21/2024 1804                      Point: Body mechanics (Done)       Learning Progress Summary            Patient Acceptance, E,D,I, NR,VU by  at 11/21/2024 1804   Family Acceptance, E,D,I, NR,VU by  at 11/21/2024 1804                      Point: Precautions (Done)       Learning Progress Summary            Patient Acceptance, E,D,I, NR,VU by  at 11/21/2024 1804   Family Acceptance, E,D,I, NR,VU by  at 11/21/2024 1804                                      User Key       Initials Effective Dates Name Provider Type Discipline     05/24/22 -  Jacklyn Higgins PT Physical Therapist PT                  PT Recommendation and Plan  Planned Therapy Interventions (PT): balance training, bed mobility training, gait training, home exercise program, patient/family education, stretching, strengthening, neuromuscular re-education, ROM (range of motion), transfer training, postural re-education  Outcome Evaluation: Pt is a 58 y/o F POD0 R TKA. Pts dtr present to translate (declined used of iPad) and report pt requires assist at baseline for all ADLs/mobility, using a RW for short house-hold distances and WC for longer distances, owns a cane, has caregivers throughout the week and lives with her son. Pts dtr states she will take her home to  her house at [place] as there will only be one small door threshold step, compared to several steps at the [facility]. Today, pt was Kindra for bed mobility, Min/ModA for transfers w/ RW and Min/ModA for ambulation of 14' w/ RW demoing typical post-op antalgic gait mechanics in a step-to pattern. No knee buckling, LOB, SOB, or dizziness. Pt performed TKA protocol ther-ex for 5 reps demoing R knee AROM of 5-70deg. Pts family edu on HEP, icing, positioning, and sequencing and safety of ambulation; they v/u. Pt will benefit from skilled PT services to address their post-op impaired gait, balance, ROM and endurance. SBAR w/ RN following session. Rec home w/ HH PT at [place].     Time Calculation:         PT Charges       Row Name 11/21/24 1805             Time Calculation    Start Time 1715  -MG      Stop Time 1755  -MG      Time Calculation (min) 40 min  -MG      PT Received On 11/21/24  -MG      PT - Next Appointment 11/22/24  -MG      PT Goal Re-Cert Due Date 11/28/24  -MG         Time Calculation- PT    Total Timed Code Minutes- PT 28 minute(s)  -MG                User Key  (r) = Recorded By, (t) = Taken By, (c) = Cosigned By      Initials Name Provider Type    MG Jacklyn Higgins, PT Physical Therapist                  Therapy Charges for Today       Code Description Service Date Service Provider Modifiers Qty    47246546403  PT EVAL MOD COMPLEXITY 2 11/21/2024 Jacklyn Higgins, PT GP 1    83308970913 HC PT THERAPEUTIC ACT EA 15 MIN 11/21/2024 Jacklyn Higgins, PT GP 2            PT G-Codes  AM-PAC 6 Clicks Score (PT): 13  PT Discharge Summary  Anticipated Discharge Disposition (PT): home with home health, home with assist    Jacklyn Higgins PT  11/21/2024

## 2024-11-21 NOTE — H&P
Orthopaedic H&P      Patient: Rocio Moore    Date of Admission: 11/21/2024  7:33 AM    YOB: 1965    Medical Record Number: [id number]    Attending Physician:  Gabe Ashley MD    Chief Complaints: Right knee osteoarthritis    History of Present Illness: 59 y.o. female presents today for a right total knee arthroplasty.  She has failed conservative management.  No changes since last being seen in the office.    Allergies: No Known Allergies    Medications:   Home Medications:  Medications Prior to Admission   Medication Sig Dispense Refill Last Dose/Taking    cetirizine (zyrTEC) 10 MG tablet Take 1 tablet by mouth Daily.   11/20/2024    hydrALAZINE (APRESOLINE) 50 MG tablet Take 1 tablet by mouth 3 (Three) Times a Day.   11/20/2024    Omega-3 Fatty Acids (fish oil) 1000 MG capsule capsule Take 1 capsule by mouth Daily With Breakfast. TO HOLD 1 WEEK BEFORE SURGERY   11/20/2024    omeprazole (priLOSEC) 40 MG capsule Take 1 capsule by mouth Daily.   11/20/2024    simvastatin (ZOCOR) 40 MG tablet Take 1 tablet by mouth Daily.   11/20/2024    acetaminophen (TYLENOL) 500 MG tablet Take 1 tablet by mouth Every 6 (Six) Hours As Needed for Mild Pain.   11/14/2024    Cholecalciferol (Vitamin D3) 1.25 MG (60715 UT) capsule Take 1 capsule by mouth Every 7 (Seven) Days. MONDAYS 11/14/2024    Diclofenac Sodium (VOLTAREN) 1 % gel gel Apply 4 g topically to the appropriate area as directed Daily As Needed. TO HOLD 1 WEEK BEFORE SURGERY   11/19/2024    docusate sodium (COLACE) 100 MG capsule Take 1 capsule by mouth 2 (Two) Times a Day As Needed for Constipation.   11/14/2024    DULoxetine (CYMBALTA) 20 MG capsule Take 1 capsule by mouth Daily.   11/14/2024    Ergocalciferol (VITAMIN D2 PO) Take  by mouth Daily.   11/14/2024    ferrous sulfate 325 (65 FE) MG tablet Take 1 tablet by mouth Daily With Breakfast. TO HOLD 1 WEEK BEFORE SURGERY       lisinopril (PRINIVIL,ZESTRIL) 40 MG tablet Take 1 tablet by mouth  "Every Night. HOLD NIGHT BEFORE SURGERY ONLY   11/19/2024    meclizine (ANTIVERT) 25 MG tablet Take 1 tablet by mouth As Needed.   11/7/2024    meloxicam (MOBIC) 15 MG tablet Take 1 tablet by mouth Daily. TO HOLD 1 WEEK BEFORE SURGERY   11/14/2024    potassium chloride (MICRO-K) 10 MEQ CR capsule Take 1 capsule by mouth Daily.   11/14/2024       Current Medications:  Scheduled Meds:ceFAZolin, 2,000 mg, Intravenous, Once  meloxicam, 15 mg, Oral, Daily  ropivacaine 0.5 % 50 mL, cloNIDine 80 mcg, ketorolac 30 mg, EPINEPHrine 0.3 mg in sodium chloride 0.9 % 50 mL solution, , Injection, Once  sodium chloride, 3 mL, Intravenous, Q12H      Continuous Infusions:lactated ringers, 9 mL/hr, Last Rate: 9 mL/hr (11/21/24 0900)      PRN Meds:.  midazolam    sodium chloride    Past Medical History:   Diagnosis Date    Arthritis     GERD (gastroesophageal reflux disease)     H/O dizziness     Hearing loss     HEARING AIDS    Hypertension     Right knee pain      Past Surgical History:   Procedure Laterality Date    SHOULDER SURGERY      STATES \"HAD SWOLLEN LUMP REMOVED FROM COVID SHOT\"     Social History     Occupational History    Not on file   Tobacco Use    Smoking status: Never    Smokeless tobacco: Never   Vaping Use    Vaping status: Never Used   Substance and Sexual Activity    Alcohol use: Never    Drug use: Never    Sexual activity: Defer      Social History     Social History Narrative    Not on file     Family History   Problem Relation Age of Onset    Malig Hyperthermia Neg Hx        Review of Systems:   No other pertinent positives or negatives other than what is mentioned in the HPI and below.  Constitutional: Negative for fatigue, fever, or weight loss  HEENT: No active headache.  Pulmonary: Patient denies SOA.  Cardiovascular: Patient denies any chest pain.  Gastrointestinal:  Patient denies active vomiting or diarrhea.  Musculoskeletal: Positive for right knee pain.  Neurological: Patient denies active dizziness or " loss of consciousness.  Skin: Patient denies any active bleeding.    Vital signs in last 24 hours:  Temp:  [98.2 °F (36.8 °C)] 98.2 °F (36.8 °C)  Heart Rate:  [66-76] 68  Resp:  [16] 16  BP: (164-189)/(87-93) 187/87  Vitals:    11/21/24 0926 11/21/24 0927 11/21/24 0928 11/21/24 0929   BP:       Patient Position:       Pulse: 69 66 69 68   Resp:       Temp:       TempSrc:       SpO2: 100% 100% 100% 100%   Weight:       Height:             Physical Exam: 59 y.o. female        General Appearance:  Alert, cooperative, in no acute distress    HEENT:    Atraumatic, Pupils are equal   Neck:   Cervical spine midline, no appreciable JVD   Lungs:     Breathing non-labored and chest rise symmetric    Heart:   Abdomen:     Rectal:    Extremities:   Pulses  Neurovascular:   Skin:  Musculoskeletal:         Pulse regular    Soft, Non-tender or distended    Deferred    No clubbing, cyanosis, or edema    Intact    Cranial nerves 2 - 12 grossly intact, sensation intact    No skin lesions  Right knee skin intact.  Crepitus with motion.  Tenderness to palpation.  Normal motor and sensory exam.  Compartments are soft.  No skin lesions.     Assessment:  Right knee osteoarthritis.    Plan:  The patient voiced understanding of the risks, benefits, and alternative forms of treatment that were discussed and the patient consents to proceed with right total knee arthroplasty as planned.  No changes.  All questions answered.     Date: 11/21/2024  Gabe Ashley MD

## 2024-11-22 ENCOUNTER — HOME HEALTH ADMISSION (OUTPATIENT)
Dept: HOME HEALTH SERVICES | Facility: HOME HEALTHCARE | Age: 59
End: 2024-11-22
Payer: MEDICAID

## 2024-11-22 ENCOUNTER — DOCUMENTATION (OUTPATIENT)
Dept: HOME HEALTH SERVICES | Facility: HOME HEALTHCARE | Age: 59
End: 2024-11-22
Payer: MEDICAID

## 2024-11-22 VITALS
RESPIRATION RATE: 16 BRPM | WEIGHT: 138.89 LBS | SYSTOLIC BLOOD PRESSURE: 109 MMHG | OXYGEN SATURATION: 95 % | HEIGHT: 57 IN | BODY MASS INDEX: 29.96 KG/M2 | TEMPERATURE: 98.1 F | HEART RATE: 83 BPM | DIASTOLIC BLOOD PRESSURE: 50 MMHG

## 2024-11-22 LAB
ANION GAP SERPL CALCULATED.3IONS-SCNC: 12.4 MMOL/L (ref 5–15)
BUN SERPL-MCNC: 14 MG/DL (ref 8–23)
BUN/CREAT SERPL: 22.2 (ref 7–25)
CALCIUM SPEC-SCNC: 8.3 MG/DL (ref 8.6–10.5)
CHLORIDE SERPL-SCNC: 102 MMOL/L (ref 98–107)
CO2 SERPL-SCNC: 21.6 MMOL/L (ref 22–29)
CREAT SERPL-MCNC: 0.63 MG/DL (ref 0.57–1)
EGFRCR SERPLBLD CKD-EPI 2021: 101.7 ML/MIN/1.73
GLUCOSE SERPL-MCNC: 155 MG/DL (ref 65–99)
HCT VFR BLD AUTO: 32.7 % (ref 34–46.6)
HGB BLD-MCNC: 10.8 G/DL (ref 12–15.9)
POTASSIUM SERPL-SCNC: 3.9 MMOL/L (ref 3.5–5.2)
SODIUM SERPL-SCNC: 136 MMOL/L (ref 136–145)

## 2024-11-22 PROCEDURE — 80048 BASIC METABOLIC PNL TOTAL CA: CPT | Performed by: ORTHOPAEDIC SURGERY

## 2024-11-22 PROCEDURE — G0378 HOSPITAL OBSERVATION PER HR: HCPCS

## 2024-11-22 PROCEDURE — 85014 HEMATOCRIT: CPT | Performed by: ORTHOPAEDIC SURGERY

## 2024-11-22 PROCEDURE — 85018 HEMOGLOBIN: CPT | Performed by: ORTHOPAEDIC SURGERY

## 2024-11-22 PROCEDURE — 25010000002 CEFAZOLIN PER 500 MG: Performed by: ORTHOPAEDIC SURGERY

## 2024-11-22 PROCEDURE — 97530 THERAPEUTIC ACTIVITIES: CPT

## 2024-11-22 RX ORDER — HYDROCODONE BITARTRATE AND ACETAMINOPHEN 7.5; 325 MG/1; MG/1
1 TABLET ORAL EVERY 4 HOURS PRN
Qty: 30 TABLET | Refills: 0 | Status: SHIPPED | OUTPATIENT
Start: 2024-11-22

## 2024-11-22 RX ORDER — ASPIRIN 81 MG/1
81 TABLET ORAL EVERY 12 HOURS SCHEDULED
Qty: 55 TABLET | Refills: 0 | Status: SHIPPED | OUTPATIENT
Start: 2024-11-22 | End: 2024-12-20

## 2024-11-22 RX ORDER — AMOXICILLIN 250 MG
1 CAPSULE ORAL 2 TIMES DAILY
Qty: 30 TABLET | Refills: 0 | Status: SHIPPED | OUTPATIENT
Start: 2024-11-22

## 2024-11-22 RX ADMIN — SENNOSIDES AND DOCUSATE SODIUM 1 TABLET: 50; 8.6 TABLET ORAL at 09:07

## 2024-11-22 RX ADMIN — SODIUM CHLORIDE 2000 MG: 900 INJECTION INTRAVENOUS at 02:52

## 2024-11-22 RX ADMIN — POTASSIUM CHLORIDE 10 MEQ: 750 TABLET, EXTENDED RELEASE ORAL at 09:07

## 2024-11-22 RX ADMIN — MELOXICAM 15 MG: 15 TABLET ORAL at 09:06

## 2024-11-22 RX ADMIN — ATORVASTATIN CALCIUM 20 MG: 20 TABLET, FILM COATED ORAL at 09:06

## 2024-11-22 RX ADMIN — HYDROCODONE BITARTRATE AND ACETAMINOPHEN 2 TABLET: 7.5; 325 TABLET ORAL at 00:12

## 2024-11-22 RX ADMIN — CETIRIZINE HYDROCHLORIDE 10 MG: 10 TABLET, FILM COATED ORAL at 09:07

## 2024-11-22 RX ADMIN — PANTOPRAZOLE SODIUM 40 MG: 40 TABLET, DELAYED RELEASE ORAL at 06:52

## 2024-11-22 RX ADMIN — ASPIRIN 81 MG: 81 TABLET, COATED ORAL at 09:08

## 2024-11-22 RX ADMIN — FERROUS SULFATE TAB 325 MG (65 MG ELEMENTAL FE) 325 MG: 325 (65 FE) TAB at 09:07

## 2024-11-22 RX ADMIN — HYDROCODONE BITARTRATE AND ACETAMINOPHEN 2 TABLET: 7.5; 325 TABLET ORAL at 12:25

## 2024-11-22 RX ADMIN — ACETAMINOPHEN 1000 MG: 500 TABLET ORAL at 09:07

## 2024-11-22 RX ADMIN — DULOXETINE HYDROCHLORIDE 20 MG: 20 CAPSULE, DELAYED RELEASE ORAL at 09:07

## 2024-11-22 NOTE — PLAN OF CARE
Goal Outcome Evaluation:  Plan of Care Reviewed With: patient, family        Progress: improving  Outcome Evaluation: POD 1 R knee arthroplasty. ace wraps around the right knee, no drainage visible. neurovascular intact. VSS. BP soft at times. ambulated to the bathroom with 2 assist, very weak and unsteady gait. used the bedside commode the second time. norco given x 1 for knee pain.

## 2024-11-22 NOTE — PROGRESS NOTES
[facility] will follow post hospital as requested. Patient/daughter agreeable to service. Contact information confirmed. Patient will be staying with daughter at [id number] [place] [address]. Home Health should call daughter at [phone] to schedule home visits.

## 2024-11-22 NOTE — PROGRESS NOTES
Procedure(s):  TOTAL KNEE ARTHROPLASTY     LOS: 0 days     Subjective :   Complains of pain controlled with meds.    Objective :    Vital signs in last 24 hours:  Vitals:    11/22/24 0159 11/22/24 0205 11/22/24 0429 11/22/24 0733   BP: 91/51 97/51 111/54    BP Location: Right arm Right arm Right arm    Patient Position:  Lying Lying    Pulse:   73    Resp:   20    Temp:   97.9 °F (36.6 °C)    TempSrc:   Oral Oral   SpO2:   95%    Weight:       Height:           PHYSICAL EXAM:  Patient is calm, in no acute distress, awake and oriented x 3.  Dressing is clean, dry and intact.  No signs of infection.  Swelling is appropriate in amount.  Ecchymosis is appropriate in amount.  Homans test is negative.  Patient is neurovascularly intact distally.    LABS:  Results from last 7 days   Lab Units 11/22/24  0518   HEMOGLOBIN g/dL 10.8*   HEMATOCRIT % 32.7*     Results from last 7 days   Lab Units 11/22/24  0518   SODIUM mmol/L 136   POTASSIUM mmol/L 3.9   CHLORIDE mmol/L 102   CO2 mmol/L 21.6*   BUN mg/dL 14   CREATININE mg/dL 0.63   GLUCOSE mg/dL 155*   CALCIUM mg/dL 8.3*             ASSESSMENT:  Status post Procedure(s):  TOTAL KNEE ARTHROPLASTY      Plan:  Continue Physical Therapy, increase mobility and range of motion as tolerated.  Continue SCDs, Continue DVT prophylaxis.  Aspirin 81 mg BID after discharge.  Dispo planning for today.    Meli Arriaga PA-C    Date: 11/22/2024  Time: 08:00 JUSTIN Arriaga PA-C

## 2024-11-22 NOTE — DISCHARGE INSTRUCTIONS
Dr. Dr. Brady Ashley Total Knee Joint Replacement Discharge Instructions:  Office Phone Number: [phone]    I. ACTIVITIES:  1. Exercises:  Complete exercise program as taught by the hospital physical therapist 2 times per day  Exercise program will be advanced by the physical therapist  During the day be up ambulating every 2 hours (while awake) for short distances  Complete the ankle pump exercises at least 10 times per hour (while awake)  Elevate legs most of the day the first week post operatively and thereafter elevate legs when in bed and for at least 30 minutes during the day. Caution must be taken to avoid pillow placement under the bend of the knee as this can led to flexion contractures of the knee.  Use cold packs 20-30 minutes approximately 5 times per day. This should be done before and after completing your exercises and at any time you are experiencing pain/ stiffness in your operative extremity.      2. Activities of Daily Living:  No tub baths, hot tubs, or swimming pools for 4 weeks  The clear dressing with thin white gauze strip dressing is waterproof.  You may shower without covering the dressing.  After 7 days you may remove the dressing.  After dressing removal, do not scrub or rub the incision. Allow skin glue to fall off over the next few weeks.  After the dressing is removed, simply let the water run over the incision and pat dry.    II. Restrictions  Do not kneel on operative leg.  Dr. Ashley will discuss with you when you will be able to drive again.  Weight bearing is as tolerated, and range of motion as tolerated.  First week stay inside on even terrain. May go up and down stairs one stair at a time utilizing the hand rail.  Once you feel confident, you may venture outside.    III. Precautions:  Everyone that comes near you should wash their hands  Avoid if possible dental work or other elective surgeries within 12 weeks of your surgical procedure.   If dental work or surgical  procedure is deemed absolutely necessary within 12 weeks of surgery, you will need to contact Dr. Ashley office as you will need to take antibiotics 1 hour prior to any dental work (including teeth cleanings).  Dr. Ashley will prescribe prophylactic antibiotics for all dental procedures for one year  as a precautionary measure to minimize risk of infection.  If you are a diabetic or take immunosuppressive medication, you may have to take prophylactic antibiotics the remainder of your life before dental work.    Avoid sick people. If you must be around someone who is ill, they should wear a mask.  Avoid visits to the Emergency Room or Urgent Care unless you are having a life threatening event.   Dr. Ashley does not routinely place on stockings, but if you are having swelling in your feet or ankle then you may obtain stockings from your local pharmacy or [facility].   Stockings are to be placed on in the morning and removed at night. Monitor the stockings to ensure that any swelling is not causing the stockings to become too tight. In this case, remove stockings immediately.    IV. INCISION CARE:   Dr. Brady Ashley takes great care in closing your incision to give you the best opportunity for a healthy incision with minimal scarring. He places sutures below the skin surface that will eventually dissolve.  The incision is then covered with a skin glue which makes the incision water tight, and minimizes bleeding onto the dressing.  No staples are used.  Occasionally one of the buried stitches may come to the skin surface and may need to be removed.  Please resist the temptation of removing the stitch by yourself.  Dr. Ashley will be happy to remove it for you.  Bruising around the knee and back of thigh is normal and to be expected.  You may also have pain and or bruising in the thigh where a tourniquet was used.    Please keep dressing in place at least until post-op day 7. You may remove and replace  dressing before day 7 if the dressing begins to fall off or becomes saturated. Wash your hands and under your finger nails prior to dressing changes.  After day 7 as long as incision is dry and intact, you may leave the dressing off and open to air.    If dressing must be changed, utilize dry gauze and paper tape. Avoid touching the side of the gauze that goes against the incision with your hands.  No creams or ointments to the incision until permission given by Dr. Ashley.  Do not touch or pick at the incision, or try to remove any sutures or skin glue.  Check dressing every day and notify surgeon immediately if any of the following signs or symptoms are noted:  Increase in redness  Increase in swelling of the entire extremity that does not go away with elevation.  Notify office that you may have a blood clot.    Drainage oozing from the incision  Pulling apart of the edges of the incision  Increase in overall body temperature (greater than 100.5 degrees)    V. Medications:   1. Anticoagulants: You will be discharged on an anticoagulant. This is a prophylactic medication that helps prevent blood clots during your post-operative period. The type and length of dosage varies based on your individual needs, procedure performed, and Dr. Ashley's preference.  While taking the anticoagulant, you should avoid taking any additional aspirin than what is prescribed.   Notify surgeon immediately if any nancy bleeding is noted in the urine, stool, emesis, or from the nose or the incision. Blood in the stool will often appear as black rather than red. Blood in urine may appear as pink. Blood in emesis may appear as brown/black like coffee grounds.  You will need to apply pressure for longer periods of time to any cuts or abrasions to stop bleeding  Avoid alcohol while taking anticoagulants.    2. Stool Softeners: You will be at greater risk of constipation after surgery due to being less mobile and the pain medications.   Take  stool softeners as instructed by your surgeon while on pain medications. Over the counter Colace 100 mg 1-2 capsules twice daily.   If stools become too loose or too frequent, please decreases the dosage or stop the stool softener.  If constipation occurs despite use of stool softeners, you are to continue the stool softeners and add a laxative (Milk of Magnesia 1 ounce daily as needed).  If no bowel movement occurs past 3 days, then purchase Magnesium citrate and drink 1/2 bottle every 8 hours (on ice tastes better) until success. If no bowel movement by post-operative day 5,  please call Dr. Ashley's office for further instructions.   You may need to decrease or stop your pain medications if bowel movements to not occur.     Drink plenty of fluids, and eat fruits and vegetables during your recovery time.    3. Pain Medications utilized after surgery are narcotics and the law requires that the following information be given to all patients that are prescribed narcotics:  CLASSIFICATION: Pain medications are called Opioids and are narcotics  LEGALITIES: It is illegal to share narcotics with others and to drive within 24 hours of taking narcotics  POTENTIAL SIDE EFFECTS: Potential side effects of opioids include: nausea, vomiting, itching, dizziness, drowsiness, dry mouth, constipation, and difficulty urinating.  POTENTIAL ADVERSE EFFECTS:   Opioid tolerance can develop with use of pain medications and this simply means that it requires more and more of the medication to control pain; however, this is seen more in patients that use opioids for longer periods of time.  Opioid dependence can develop with use of Opioids and this simply means that to stop the medication can cause withdrawal symptoms; however, this is seen with patients that use Opioids for longer periods of time.  Opioid addiction can develop with use of Opioids and the incidence of this is very unlikely in patients who take the medications as ordered  "and stop the medications as instructed.  Opioid overdose can be dangerous, but is unlikely when the medication is taken as ordered and stopped when ordered. It is important not to mix opioids with alcohol or with and type of sedative such as Benadryl as this can lead to over sedation and respiratory difficulty.  DOSAGE:   Pain medications will need to be taken consistently for the first few days to decrease pain and promote adequate pain relief and participation in physical therapy.  After the initial surgical pain begins to resolve, you may begin to decrease the pain medication. By the end of 6 weeks, you should be off of pain medications except for before physical therapy or to help with pain when attempting to fall asleep.  Pain medications will be tapered to lesser dosages as you are further from your surgical day.  No pain medications will be provided after 3 months from surgery.     Refills will not be given by the office during evening hours, or weekends.  To seek refills on pain medications during the post-operative period, you must call the office 48 hours in advance to request the refill. The office will then notify you when to  the prescription. DO NOT wait until you are out of the medication to request a refill.  They can not be \"called in\" to the pharmacy.      V. FOLLOW-UP VISITS:  You will need to follow up in the office with Dr. Ashley in 10-14 days.  Please call [phone] if you need to confirm or reschedule your appointment time.   If you have any concerns or suspected complications prior to your follow up visit, please call your surgeons office. Do not wait until your appointment time if you suspect complications. These will need to be addressed in the office promptly.  "

## 2024-11-22 NOTE — THERAPY TREATMENT NOTE
"Patient Name: Rocio Moore  : 1965    MRN: [id number]                              Today's Date: 2024       Admit Date: 2024    Visit Dx: No diagnosis found.  Patient Active Problem List   Diagnosis    Status post total knee replacement, right     Past Medical History:   Diagnosis Date    Arthritis     GERD (gastroesophageal reflux disease)     H/O dizziness     Hearing loss     HEARING AIDS    Hypertension     Right knee pain      Past Surgical History:   Procedure Laterality Date    SHOULDER SURGERY      STATES \"HAD SWOLLEN LUMP REMOVED FROM COVID SHOT\"      General Information       Row Name 24 0845          Physical Therapy Time and Intention    Document Type therapy note (daily note)  -CW     Mode of Treatment physical therapy;individual therapy  -CW       Row Name 24 0845          General Information    Patient Profile Reviewed yes  -CW     Existing Precautions/Restrictions fall  -CW     Barriers to Rehab language barrier  daughter translates  -CW       Row Name 24 0845          Cognition    Orientation Status (Cognition) oriented x 3  -CW       Row Name 24 0845          Safety Issues/Impairments Affecting Functional Mobility    Impairments Affecting Function (Mobility) endurance/activity tolerance;pain;range of motion (ROM);strength  -CW               User Key  (r) = Recorded By, (t) = Taken By, (c) = Cosigned By      Initials Name Provider Type    CW Annabella Ceron, DK Physical Therapist                   Mobility       Row Name 24 0846          Bed Mobility    Comment, (Bed Mobility) NT- coming out of bathroom with nsg at arrival  -CW       Row Name 24 0846          Sit-Stand Transfer    Sit-Stand [place] (Transfers) minimum assist (75% patient effort);verbal cues  -CW     Assistive Device (Sit-Stand Transfers) walker, front-wheeled  -CW     Comment, (Sit-Stand Transfer) STS x2  -CW       Row Name 24 0846          Gait/Stairs (Locomotion) "    [place] Level (Gait) contact guard;verbal cues  -CW     Assistive Device (Gait) walker, front-wheeled  -CW     Distance in Feet (Gait) 12  -CW     Deviations/Abnormal Patterns (Gait) scar decreased;gait speed decreased;antalgic;stride length decreased  -CW     Bilateral Gait Deviations forward flexed posture;heel strike decreased  -CW     Comment, (Gait/Stairs) further distance limited by dizziness when upright, RN notified  -CW               User Key  (r) = Recorded By, (t) = Taken By, (c) = Cosigned By      Initials Name Provider Type    CW Annabella Ceron PT Physical Therapist                   Obj/Interventions       Row Name 11/22/24 0846          Motor Skills    Therapeutic Exercise other (see comments)  tka protocol with assist x5 reps  -CW       Row Name 11/22/24 0846          Balance    Balance Assessment standing static balance  -CW     Static Standing Balance contact guard  -CW     Dynamic Standing Balance contact guard  -CW     Position/Device Used, Standing Balance supported;walker, front-wheeled  -CW               User Key  (r) = Recorded By, (t) = Taken By, (c) = Cosigned By      Initials Name Provider Type    CW Annabella Ceron, PT Physical Therapist                   Goals/Plan    No documentation.                  Clinical Impression       Row Name 11/22/24 0905          Pain    Pretreatment Pain Rating 5/10  -CW     Posttreatment Pain Rating 5/10  -CW     Pain Location knee  -CW     Pain Side/Orientation right  -CW     Pain Management Interventions exercise or physical activity utilized;positioning techniques utilized  -CW     Response to Pain Interventions activity participation with tolerable pain  -CW       Row Name 11/22/24 0905          Plan of Care Review    Plan of Care Reviewed With patient;family  -     Outcome Evaluation Pt participated with PT this morning. [place] [facility] [place] upon arrival. Pt required seated rest break before ambulating  further with PT due to dizziness. Completed TKA exercise protocol with assist from PT. Pt stood twice with min A and ambulated 12' cga with walker. Antalgic gait noted but distance primarily limited due to dizziness. Pt will need RW for d/c, daughter present to translate throughout session. Daughter plans to bring pt to her house, will have 24/7 care.  -CW       Row Name 11/22/24 0905          Vital Signs    O2 Delivery Pre Treatment room air  -CW     O2 Delivery Intra Treatment room air  -CW     O2 Delivery Post Treatment room air  -CW       Row Name 11/22/24 0905          Positioning and Restraints    Pre-Treatment Position standing in room  -CW     Post Treatment Position chair  -CW     In Chair reclined;call light within reach;encouraged to call for assist;exit alarm on;legs elevated;notified nsg;with family/caregiver  -CW               User Key  (r) = Recorded By, (t) = Taken By, (c) = Cosigned By      Initials Name Provider Type    CW Annabella Ceron, PT Physical Therapist                   Outcome Measures       Row Name 11/22/24 0846 11/21/24 2130       How much help from another person do you currently need...    Turning from your back to your side while in flat bed without using bedrails? 3  -CW 3  -BM    Moving from lying on back to sitting on the side of a flat bed without bedrails? 3  -CW 3  -BM    Moving to and from a bed to a chair (including a wheelchair)? 3  -CW 2  -BM    Standing up from a chair using your arms (e.g., wheelchair, bedside chair)? 3  -CW 3  -BM    Climbing 3-5 steps with a railing? 2  -CW 1  -BM    To walk in hospital room? 3  -CW 2  -BM    AM-PAC 6 Clicks Score (PT) 17  -CW 14  -BM    Highest Level of Mobility Goal 5 --> Static standing  -CW 4 --> Transfer to chair/commode  -BM      Row Name 11/22/24 0846          Functional Assessment    Outcome Measure Options AM-PAC 6 Clicks Basic Mobility (PT)  -CW               User Key  (r) = Recorded By, (t) = Taken By, (c) = Cosigned By       Initials Name Provider Type     Kyleigh Webster, RN Registered Nurse     Annabella Ceron, PT Physical Therapist                                 Physical Therapy Education       Title: PT OT SLP Therapies (Done)       Topic: Physical Therapy (Done)       Point: Mobility training (Done)       Learning Progress Summary            Patient Acceptance, E, VU,NR by  at 11/22/2024 0846    Acceptance, E,D,I, NR,VU by MG at 11/21/2024 1804   Family Acceptance, E,D,I, NR,VU by MG at 11/21/2024 1804                      Point: Home exercise program (Done)       Learning Progress Summary            Patient Acceptance, E, VU,NR by  at 11/22/2024 0846    Acceptance, E,D,I, NR,VU by MG at 11/21/2024 1804   Family Acceptance, E,D,I, NR,VU by MG at 11/21/2024 1804                      Point: Body mechanics (Done)       Learning Progress Summary            Patient Acceptance, E, VU,NR by  at 11/22/2024 0846    Acceptance, E,D,I, NR,VU by MG at 11/21/2024 1804   Family Acceptance, E,D,I, NR,VU by MG at 11/21/2024 1804                      Point: Precautions (Done)       Learning Progress Summary            Patient Acceptance, E, VU,NR by  at 11/22/2024 0846    Acceptance, E,D,I, NR,VU by MG at 11/21/2024 1804   Family Acceptance, E,D,I, NR,VU by MG at 11/21/2024 1804                                      User Key       Initials Effective Dates Name Provider Type Discipline     05/24/22 -  Jacklyn Higgins, PT Physical Therapist PT     12/13/22 -  Annabella Ceron, DK Physical Therapist PT                  PT Recommendation and Plan     Outcome Evaluation: Pt participated with PT this morning. [place] [facility] [place] upon [place]. Pt required seated rest break before ambulating further with PT due to dizziness. Completed TKA exercise protocol with assist from PT. Pt stood twice with min A and ambulated 12' cga with walker. Antalgic gait noted but distance primarily limited due to dizziness. Pt  will need RW for d/c, daughter present to translate throughout session. Daughter plans to bring pt to her house, will have 24/7 care.     Time Calculation:         PT Charges       Row Name 11/22/24 0903             Time Calculation    Start Time 0838  -CW      Stop Time 0855  -CW      Time Calculation (min) 17 min  -CW      PT Received On 11/22/24  -CW      PT - Next Appointment 11/23/24  -CW                User Key  (r) = Recorded By, (t) = Taken By, (c) = Cosigned By      Initials Name Provider Type    CW Annabella Ceron, PT Physical Therapist                  Therapy Charges for Today       Code Description Service Date Service Provider Modifiers Qty    23414736096 HC PT THERAPEUTIC ACT EA 15 MIN 11/22/2024 Annabella Ceron, DK GP 1            PT G-Codes  Outcome Measure Options: AM-PAC 6 Clicks Basic Mobility (PT)  AM-PAC 6 Clicks Score (PT): 17  PT Discharge Summary  Anticipated Discharge Disposition (PT): home with 24/7 care, home with home health    Annabella Ceron PT  11/22/2024

## 2024-11-22 NOTE — PLAN OF CARE
Goal Outcome Evaluation:  Plan of Care Reviewed With: patient, family           Outcome Evaluation: Pt participated with PT this morning. Pt ambulating with [facility] staff out of  bathroom upon arrival. Pt required seated rest break before ambulating further with PT due to dizziness. Completed TKA exercise protocol with assist from PT. Pt stood twice with min A and ambulated 12' cga with walker. Antalgic gait noted but distance primarily limited due to dizziness. Pt will need RW for d/c, daughter present to translate throughout session. Daughter plans to bring pt to her house, will have 24/7 care.    Anticipated Discharge Disposition (PT): home with 24/7 care, home with home health

## 2024-11-22 NOTE — PLAN OF CARE
Goal Outcome Evaluation:      Patient is POD # 1 for a right total knee arthroplasty.  Patient speaks Lithuanian.  Daughter present to translate. RLE remains elevated. Ice applied to operative extemity. WBAT. Worked with physical therapy this am. Patient to discharge to home today.  [facility] to follow.

## 2024-11-22 NOTE — DISCHARGE PLACEMENT REQUEST
"Margi Shaefr (59 y.o. Female)       Date of Birth   1965    Social Security Number       [address] [place] [facility] [address]    Home Phone   [phone]    MRN   [id number]       Yazdanism   Religion    Marital Status                               Admission Date   11/21/24    Admission Type   Elective    Admitting Provider   Gabe Ashley MD    Attending Provider   Gabe Ashley MD    Department, Room/Bed   [address], P894/1       Discharge Date       Discharge Disposition   Home or Self Care    Discharge Destination                                 Attending Provider: Gabe Ashley MD    Allergies: No Known Allergies    Isolation: None   Infection: None   Code Status: Not on file    Ht: 144.8 cm (57.01\")   Wt: 63 kg (138 lb 14.2 oz)    Admission Cmt: None   Principal Problem: Status post total knee replacement, right [Z96.651]                   Active Insurance as of 11/21/2024       Primary Coverage       Payor Plan Insurance Group Employer/Plan Group    [place]        Payor Plan Address Payor Plan Phone Number Payor Plan Fax Number Effective Dates    [address] [phone]  12/14/2017 - None Entered    [place] [address]         Subscriber Name Subscriber Birth Date Member ID       MARGI SHAFER 1965 [id number]                     Emergency Contacts        (Rel.) Home Phone Work Phone Mobile Phone    LASHAUN SHAFER (Son) -- -- [phone]    ERASMO SHAFER (Son) -- -- [phone]                "

## 2024-11-22 NOTE — DISCHARGE SUMMARY
Discharge Summary    Date of Admission: 11/21/2024  7:33 AM  Date of Discharge:  11/22/2024    Discharge Diagnosis:   Status post total knee replacement, right [Z96.651]    PMHX:   Past Medical History:   Diagnosis Date    Arthritis     GERD (gastroesophageal reflux disease)     H/O dizziness     Hearing loss     HEARING AIDS    Hypertension     Right knee pain        Discharge Disposition      Procedures Performed  Procedure(s):  TOTAL KNEE ARTHROPLASTY       Indication for Admission  Patient is a 59 y.o. female admitted after undergoing the above surgical procedure. They were admitted for post-operative pain control, medical management and physical therapy.  They progressed with physical therapy. They were deemed stable for discharge.      Consults:   Consults       No orders found for last 30 day(s).            Discharge Instructions:  Patient is weight bearing as tolerated on the operative leg.  Patient is to progress range of motion and ambulation as tolerated.  Use walker as needed for stability and gait.  May progress to cane as tolerated.  The dressing should be left on knee until post-operative day 7, and then removed.  Dressing is waterproof. Patient may shower.  Use ace wrap as needed for swelling.  Patient will follow-up in the office in 2 weeks.  Call the office at [phone] for any questions or concerns.      Discharge Medications     Discharge Medications        ASK your doctor about these medications        Instructions Start Date   acetaminophen 500 MG tablet  Commonly known as: TYLENOL   500 mg, Every 6 Hours PRN      cetirizine 10 MG tablet  Commonly known as: zyrTEC   10 mg, Daily      Diclofenac Sodium 1 % gel gel  Commonly known as: VOLTAREN   4 g, Topical, Daily PRN, TO HOLD 1 WEEK BEFORE SURGERY       docusate sodium 100 MG capsule  Commonly known as: COLACE   100 mg, 2 Times Daily PRN      DULoxetine 20 MG capsule  Commonly known as: CYMBALTA   20 mg, Daily      ferrous sulfate 325  (65 FE) MG tablet   325 mg, Daily With Breakfast      fish oil 1000 MG capsule capsule   1,000 mg, Daily With Breakfast      hydrALAZINE 50 MG tablet  Commonly known as: APRESOLINE   50 mg, 3 Times Daily      lisinopril 40 MG tablet  Commonly known as: PRINIVIL,ZESTRIL   40 mg, Nightly      meclizine 25 MG tablet  Commonly known as: ANTIVERT   25 mg, As Needed      meloxicam 15 MG tablet  Commonly known as: MOBIC   15 mg, Daily      omeprazole 40 MG capsule  Commonly known as: priLOSEC   40 mg, Daily      potassium chloride 10 MEQ CR capsule  Commonly known as: MICRO-K   10 mEq, Daily      simvastatin 40 MG tablet  Commonly known as: ZOCOR   40 mg, Daily      VITAMIN D2 PO   Daily      Vitamin D3 1.25 MG (76350 UT) capsule   50,000 Units, Every 7 Days               Discharge Diet: return to normal diet     Activity at Discharge: WBAT, PT    Follow-up Appointments  No future appointments.      Test Results Pending at Discharge       Meli Arriaga PA-C  11/22/24,  08:03 EST

## 2024-11-23 ENCOUNTER — HOME CARE VISIT (OUTPATIENT)
Dept: HOME HEALTH SERVICES | Facility: HOME HEALTHCARE | Age: 59
End: 2024-11-23
Payer: MEDICAID

## 2024-11-23 VITALS
HEART RATE: 72 BPM | OXYGEN SATURATION: 93 % | TEMPERATURE: 96.8 F | SYSTOLIC BLOOD PRESSURE: 135 MMHG | RESPIRATION RATE: 16 BRPM | DIASTOLIC BLOOD PRESSURE: 67 MMHG

## 2024-11-23 PROCEDURE — G0151 HHCP-SERV OF PT,EA 15 MIN: HCPCS

## 2024-11-23 NOTE — HOME HEALTH
REASON FOR REFERRAL: decreased ability to enter and exit home s/p right TKR.    MEDICAL HISTORY: Patient's daughter reports that her mom has had problems with her knee since childhood. Patient had right TKR on 11/21/2024 and was discharged home on 11/22/2024. PMH includes HTN, iron deficiency, hypercholesterolemia, hypokalemia    SKILLED PHYSICAL THERAPY IS MEDICALLY NECESSARY FOR: remediation of decreased right knee ROM and strength, decreased ambulatory ability, increased risk of falls, decreased transfers, pain/edema    FOCUS OF CARE: difficulty mobility s/p right TKR -  therapeutic exercises to improve right knee ROM and strength, gait training to improve ambulatory ability, transfer training to improve ability to perform transfers, fall risk education to decrease risk of falls, pain/edema management to improve comfort    COMMUNICATION: Case communication to Dr. Ashley; case communication to Brooke Andrews RN clinical manager and Daisha Florian,

## 2024-11-23 NOTE — CASE MANAGEMENT/SOCIAL WORK
Case Management Discharge Note      Final Note: home with [employer]          Selected Continued Care - Discharged on 11/22/2024 Admission date: 11/21/2024 - Discharge disposition: Home or Self Care      Destination    No services have been selected for the patient.                Durable Medical Equipment    No services have been selected for the patient.                Dialysis/Infusion    No services have been selected for the patient.                Home Medical Care Coordination complete.      Service Provider Services Address Phone Fax Patient Preferred     [facility] [address] [place] [id number] [phone] --              Therapy    No services have been selected for the patient.                Community Resources    No services have been selected for the patient.                Community & DME    No services have been selected for the patient.                    Transportation Services  Private: Car    Final Discharge Disposition Code: 06 - home with home health care

## 2024-11-23 NOTE — Clinical Note
Home health physical therapy admission completed on 11/23/2024. Patient will be seen 1x/wk for 1 week and 3x/wk for 2 weeks for therapeutic exercises, ROM, gait training, transfer training, fall risk education, and pain/edema management

## 2024-11-25 ENCOUNTER — HOME CARE VISIT (OUTPATIENT)
Dept: HOME HEALTH SERVICES | Facility: HOME HEALTHCARE | Age: 59
End: 2024-11-25
Payer: MEDICAID

## 2024-11-25 VITALS
TEMPERATURE: 98 F | OXYGEN SATURATION: 99 % | HEART RATE: 85 BPM | DIASTOLIC BLOOD PRESSURE: 84 MMHG | SYSTOLIC BLOOD PRESSURE: 144 MMHG

## 2024-11-25 PROCEDURE — G0157 HHC PT ASSISTANT EA 15: HCPCS

## 2024-11-26 ENCOUNTER — NURSE TRIAGE (OUTPATIENT)
Dept: CALL CENTER | Facility: HOSPITAL | Age: 59
End: 2024-11-26
Payer: MEDICAID

## 2024-11-26 NOTE — TELEPHONE ENCOUNTER
"Requesting refill on narcotic pain medication. Advised to call surgeon office.    Unable to route note to surgeon office.        Reason for Disposition   Caller requesting a CONTROLLED substance prescription refill (e.g., narcotics, ADHD medicines)    Additional Information   Negative: New-onset or worsening symptoms, see that guideline (e.g., diarrhea, runny nose, sore throat)   Negative: Medicine question not related to refill or renewal   Negative: Caller (e.g., patient or pharmacist) requesting information about a new medicine   Negative: Caller requesting information unrelated to medicine   Negative: [1] Prescription refill request for ESSENTIAL medicine (i.e., likelihood of harm to patient if not taken) AND [2] triager unable to refill per department policy   Negative: [1] Prescription not at pharmacy AND [2] was prescribed by PCP recently  (Exception: Triager has access to EMR and prescription is recorded there. Go to Home Care and confirm for pharmacy.)   Negative: [1] Pharmacy calling with prescription questions AND [2] triager unable to answer question   Negative: Prescription request for new medicine (not a refill)    Answer Assessment - Initial Assessment Questions  1. DRUG NAME: \"What medicine do you need to have refilled?\"      HYDROcodone-acetaminophen (NORCO)  2. REFILLS REMAINING: \"How many refills are remaining?\" (Note: The label on the medicine or pill bottle will show how many refills are remaining. If there are no refills remaining, then a renewal may be needed.)      none  3. EXPIRATION DATE: \"What is the expiration date?\" (Note: The label states when the prescription will , and thus can no longer be refilled.)      *No Answer*  4. PRESCRIBING HCP: \"Who prescribed it?\" Reason: If prescribed by specialist, call should be referred to that group.      Dion Bernal  5. SYMPTOMS: \"Do you have any symptoms?\"      *No Answer*  6. PREGNANCY: \"Is there any chance that you are pregnant?\" \"When " "was your last menstrual period?\"      *No Answer*    Protocols used: Medication Refill and Renewal Call-ADULT-    "

## 2024-11-27 ENCOUNTER — HOME CARE VISIT (OUTPATIENT)
Dept: HOME HEALTH SERVICES | Facility: HOME HEALTHCARE | Age: 59
End: 2024-11-27
Payer: MEDICAID

## 2024-11-27 VITALS
SYSTOLIC BLOOD PRESSURE: 144 MMHG | OXYGEN SATURATION: 100 % | HEART RATE: 101 BPM | TEMPERATURE: 98.6 F | DIASTOLIC BLOOD PRESSURE: 88 MMHG

## 2024-11-27 PROCEDURE — G0157 HHC PT ASSISTANT EA 15: HCPCS

## 2024-11-27 NOTE — HOME HEALTH
Subjective: Family translates- patient sleeping upon arrival    Wound-Right knee covered with waterproof dressing- take off on day 7. new picture loaded in EPIC last visit  Edema- Right knee moderate amount   Dr Ashley they think it is 12-10-24  Outpatient- TBD   Falls- none   Medication Changes- none     Assessment: Therapist not sure of how much she is out of bed during the day and moving around every 2 hours. I have instructed tthe caregivers to encourage more out of bed activity and mobility.Patient still lacks quad strength and requires family assist in and out of the bed. She was able to manuever in the home with walker and required mod assist to come to stand with cues for hand placement from bed and from higher armed chair.. Patient was able to review her supine HEP and improved on flexion. Patient re-education on medication regimen, hydration and proper nutrition and used teach back for carryover and accuracy. Patient will benefit with continued PT for progression and reduce risk of decline.     Plan for next visit:   Gait training   Review and progress HEP   Increase ROM   Balance/transfers/safety   shower transfers if needed   follow up with  and OP appts  take bandage off

## 2024-11-29 ENCOUNTER — HOME CARE VISIT (OUTPATIENT)
Dept: HOME HEALTH SERVICES | Facility: HOME HEALTHCARE | Age: 59
End: 2024-11-29
Payer: MEDICAID

## 2024-11-29 VITALS — HEART RATE: 74 BPM | SYSTOLIC BLOOD PRESSURE: 122 MMHG | DIASTOLIC BLOOD PRESSURE: 74 MMHG | OXYGEN SATURATION: 100 %

## 2024-11-29 PROCEDURE — G0157 HHC PT ASSISTANT EA 15: HCPCS

## 2024-11-29 NOTE — HOME HEALTH
Subjective: Family translates- patient sitting on EOB upon arrival- family not consistently in the room for translation today however we managed with visual demo on exercises and then I had to find them for questions.    Wound-Right knee covered with waterproof dressing and removed with clean technique- education on not touching it with hands  Edema- Right knee moderate amount   Dr Ashley -12-2-24 at 11 am  Outpatient- 12-10-24  Falls- none   Medication Changes- none     Assessment:.Patient still lacks quad strength and requires family assist in and out of the bed. She was able to manuever in the home with walker with no unsteadiness and trialed 2 steps in the home with handrail and mod assist with cues for sequencing. Patient required min assist to come to stand with cues for hand placement from bed and from higher armed chair.Family states she has taken a shower with assist. Patient was instructed on standing  HEP  to put more weight on her legs for strengthening and improved on ROM with  flexion and still does not hold with  LAQ. Patient re-education on medication regimen, hydration and proper nutrition and used teach back for carryover and accuracy. Patient will benefit with continued PT for progression and reduce risk of decline.     Plan for next visit:   Gait training   Review and progress HEP   Increase ROM   Balance/transfers/safety   shower transfers if needed   follow up with  and OP appts

## 2024-12-02 ENCOUNTER — HOME CARE VISIT (OUTPATIENT)
Dept: HOME HEALTH SERVICES | Facility: HOME HEALTHCARE | Age: 59
End: 2024-12-02
Payer: MEDICAID

## 2024-12-02 VITALS
HEART RATE: 78 BPM | DIASTOLIC BLOOD PRESSURE: 78 MMHG | TEMPERATURE: 97.7 F | OXYGEN SATURATION: 97 % | SYSTOLIC BLOOD PRESSURE: 138 MMHG

## 2024-12-02 PROCEDURE — G0157 HHC PT ASSISTANT EA 15: HCPCS

## 2024-12-02 NOTE — HOME HEALTH
Subjective: Family translates- Therapist called to confirm the doctor follow up and it is friday not today and family present to understand change    Wound-Right knee covered with waterproof dressing and removed with clean technique- education on not touching it with hands - new picture loaded in EPIC  Edema- Right knee moderate amount   Dr Ashley -12-6- 24 at 11:15am  Outpatient- 12-10-24   Falls- none   Medication Changes- none     Assessment:.Patient still lacks quad strength and requires family assist in and out of the bed. She was able to manuever in the home with walker with no unsteadiness and trialed steps in the garage with handrail and CGA/min assist with cues for sequencing. Patient is improving with transfers with S/SBA.. Patient re-education on standing HEP to put more weight on her legs for strengthening and improved on ROM with flexion and still does not hold with LAQ consistently Patient re-education on medication regimen, hydration and proper nutrition and used teach back for carryover and accuracy. Patient will benefit with continued PT for progression and reduce risk of decline.     Plan for next visit:   Gait training   Review and progress HEP   Increase ROM   Balance

## 2024-12-04 ENCOUNTER — HOME CARE VISIT (OUTPATIENT)
Dept: HOME HEALTH SERVICES | Facility: HOME HEALTHCARE | Age: 59
End: 2024-12-04
Payer: MEDICAID

## 2024-12-04 VITALS
HEART RATE: 87 BPM | SYSTOLIC BLOOD PRESSURE: 130 MMHG | OXYGEN SATURATION: 98 % | DIASTOLIC BLOOD PRESSURE: 72 MMHG | TEMPERATURE: 98.2 F

## 2024-12-04 PROCEDURE — G0157 HHC PT ASSISTANT EA 15: HCPCS

## 2024-12-04 NOTE — HOME HEALTH
Subjective: Family translates-  doing ok, patient laying in bed upon arrival with elevation    Wound-Right knee open to air- education on not touching it with hands - new picture loaded in EPIC last visit  Edema- Right knee moderate amount   Dr Ashley -12-6- 24 at 11:15am   Outpatient- 12-10-24 at 9:30 am  Falls- none   Medication Changes- none     Assessment:.She was able to manuever in the home with walker with no unsteadiness and trialed the cane with CGA for safety short distance and family aware to provide CGA if she uses the cane however with lack of quad strength would benefit with walker for more support.Patient is improving with transfers with S/SBA.. Patient re-education on standing HEP to put more weight on her legs for strengthening and steady on ROM with flexion and still does not hold with LAQ consistently. Patient re-education on medication regimen, hydration and proper nutrition and used teach back for carryover and accuracy. Patient will benefit with continued PT for progression and reduce risk of decline and will be discharged next visit and progress to OP next week.    Plan for next visit:   Gait training - trial cane again  Review and progress HEP   Increase ROM   Balance

## 2024-12-06 ENCOUNTER — HOME CARE VISIT (OUTPATIENT)
Dept: HOME HEALTH SERVICES | Facility: HOME HEALTHCARE | Age: 59
End: 2024-12-06
Payer: MEDICAID

## 2024-12-06 VITALS
SYSTOLIC BLOOD PRESSURE: 118 MMHG | OXYGEN SATURATION: 100 % | TEMPERATURE: 98 F | DIASTOLIC BLOOD PRESSURE: 72 MMHG | HEART RATE: 78 BPM | RESPIRATION RATE: 18 BRPM

## 2024-12-06 PROCEDURE — G0151 HHCP-SERV OF PT,EA 15 MIN: HCPCS

## 2024-12-06 NOTE — HOME HEALTH
Pt has met all HH PT goals.  She has f/u appt this morning with her surgeon and is set up to begin OP PT next week.  Visit was translated by family but they report pt is using her rwx, ambulating as directed, we reviewed HEP.

## (undated) DEVICE — GLV SURG BIOGEL LTX PF 8

## (undated) DEVICE — PK KN TOTL 40

## (undated) DEVICE — DUAL CUT SAGITTAL BLADE

## (undated) DEVICE — DRSNG SURESITE123 4X10IN

## (undated) DEVICE — NEEDLE, QUINCKE, 18GX3.5": Brand: MEDLINE

## (undated) DEVICE — GLV SURG PREMIERPRO ORTHO LTX PF SZ8 BRN

## (undated) DEVICE — STRAP STIRUP SLP RNG 19X3.5IN DISP

## (undated) DEVICE — PENCL SMOKE/EVAC MEGADYNE TELESCP 10FT

## (undated) DEVICE — SUT VIC PLS UD BR COAT ANTIB ABS CT1 SZ1 36MM 27IN VCPP40D

## (undated) DEVICE — RECIPROCATING BLADE, DOUBLE SIDED, OFFSET  (70.0 X 0.64 X 12.6MM)

## (undated) DEVICE — SOL IRR NACL 0.9PCT 3000ML

## (undated) DEVICE — GLV SURG PREMIERPRO ORTHO LTX PF SZ7.5 BRN

## (undated) DEVICE — GLV SURG SIGNATURE ESSENTIAL PF LTX SZ8.5

## (undated) DEVICE — BNDG,ELSTC,MATRIX,STRL,4"X5YD,LF,HOOK&LP: Brand: MEDLINE

## (undated) DEVICE — SUT VIC 0 CT1 36IN J946H

## (undated) DEVICE — PAD,NON-ADHERENT,3X8,STERILE,LF,1/PK: Brand: MEDLINE

## (undated) DEVICE — ANTIBACTERIAL UNDYED BRAIDED (POLYGLACTIN 910), SYNTHETIC ABSORBABLE SUTURE: Brand: COATED VICRYL

## (undated) DEVICE — TRAP FLD MINIVAC MEGADYNE 100ML

## (undated) DEVICE — PATIENT RETURN ELECTRODE, SINGLE-USE, CONTACT QUALITY MONITORING, ADULT, WITH 9FT CORD, FOR PATIENTS WEIGING OVER 33LBS. (15KG): Brand: MEGADYNE

## (undated) DEVICE — APPL CHLORAPREP HI/LITE 26ML ORNG

## (undated) DEVICE — ADHS SKIN SURG TISS VISC PREMIERPRO EXOFIN HI/VISC FAST/DRY

## (undated) DEVICE — DRAPE,REIN 53X77,STERILE: Brand: MEDLINE

## (undated) DEVICE — DRAPE,U/ SHT,SPLIT,PLAS,STERIL: Brand: MEDLINE

## (undated) DEVICE — PREP SOL POVIDONE/IODINE BT 4OZ